# Patient Record
Sex: MALE | Race: BLACK OR AFRICAN AMERICAN | NOT HISPANIC OR LATINO | Employment: OTHER | URBAN - METROPOLITAN AREA
[De-identification: names, ages, dates, MRNs, and addresses within clinical notes are randomized per-mention and may not be internally consistent; named-entity substitution may affect disease eponyms.]

---

## 2018-09-05 ENCOUNTER — TELEPHONE (OUTPATIENT)
Dept: GASTROENTEROLOGY | Facility: CLINIC | Age: 68
End: 2018-09-05

## 2018-09-21 ENCOUNTER — TELEPHONE (OUTPATIENT)
Dept: GASTROENTEROLOGY | Facility: CLINIC | Age: 68
End: 2018-09-21

## 2018-09-21 NOTE — TELEPHONE ENCOUNTER
----- Message from Lena Loera sent at 9/21/2018 10:25 AM CDT -----  Contact: self - [phone]  merlinmi - returning your call - please call patient at

## 2018-10-31 ENCOUNTER — TELEPHONE (OUTPATIENT)
Dept: GASTROENTEROLOGY | Facility: CLINIC | Age: 68
End: 2018-10-31

## 2018-10-31 NOTE — TELEPHONE ENCOUNTER
----- Message from Lena Loera sent at 10/31/2018 12:35 PM CDT -----  Contact: self - [phone]  merlinmi = is asking to be scheduled to next week - please call patient at

## 2018-11-01 ENCOUNTER — TELEPHONE (OUTPATIENT)
Dept: GASTROENTEROLOGY | Facility: CLINIC | Age: 68
End: 2018-11-01

## 2018-11-01 NOTE — TELEPHONE ENCOUNTER
Pt rescheduled for np appt  Re explained  process.  Called pt and scheduled NP appointment for 12/20/18 at 11am. Discussed with pt that Dr. Jimenez is a consultant who will send them back to their general GI provider once their symptoms improved and plan of care is established. Pt was told the logistics of the appointment (arrival time, length of visit, total time spent at facility, and Kesha Clemens, NP role). Pt was also told that Dr. Jimenez will review their previous workup but may order additional test and perform her own procedures and that this may require an overnight stay at a local hotel to complete the workup.

## 2018-11-01 NOTE — TELEPHONE ENCOUNTER
----- Message from Alondra Su MA sent at 10/31/2018  5:40 PM CDT -----  Lena NAVA Staff  Caller: self - [phone] (Today,  4:16 PM)         randi - is asking to reschedule her appt - please call patient at

## 2018-12-19 ENCOUNTER — TELEPHONE (OUTPATIENT)
Dept: GASTROENTEROLOGY | Facility: CLINIC | Age: 68
End: 2018-12-19

## 2018-12-19 NOTE — TELEPHONE ENCOUNTER
Spoke with pt to reschedule appt. Informed pt that appt are two months out so she will have to reschedule for time in february. Pt asked to be placed on waiting list.

## 2018-12-19 NOTE — TELEPHONE ENCOUNTER
----- Message from Andreea Cardona sent at 12/19/2018 11:59 AM CST -----  Contact: pt#[phone]  Needs Advice    Reason for call:Pt is calling to reschedule appt         Communication Preference:call    Additional Information:

## 2019-02-04 ENCOUNTER — TELEPHONE (OUTPATIENT)
Dept: GASTROENTEROLOGY | Facility: CLINIC | Age: 69
End: 2019-02-04

## 2019-02-04 ENCOUNTER — OFFICE VISIT (OUTPATIENT)
Dept: GASTROENTEROLOGY | Facility: CLINIC | Age: 69
End: 2019-02-04
Payer: MEDICARE

## 2019-02-04 ENCOUNTER — LAB VISIT (OUTPATIENT)
Dept: LAB | Facility: HOSPITAL | Age: 69
End: 2019-02-04
Payer: MEDICARE

## 2019-02-04 ENCOUNTER — TELEPHONE (OUTPATIENT)
Dept: ENDOSCOPY | Facility: HOSPITAL | Age: 69
End: 2019-02-04

## 2019-02-04 VITALS
SYSTOLIC BLOOD PRESSURE: 132 MMHG | WEIGHT: 214.5 LBS | DIASTOLIC BLOOD PRESSURE: 78 MMHG | HEART RATE: 70 BPM | BODY MASS INDEX: 33.67 KG/M2 | HEIGHT: 67 IN

## 2019-02-04 DIAGNOSIS — K59.00 CONSTIPATION, UNSPECIFIED CONSTIPATION TYPE: ICD-10-CM

## 2019-02-04 DIAGNOSIS — D50.9 IRON DEFICIENCY ANEMIA, UNSPECIFIED IRON DEFICIENCY ANEMIA TYPE: ICD-10-CM

## 2019-02-04 DIAGNOSIS — R07.9 CHEST PAIN, UNSPECIFIED TYPE: ICD-10-CM

## 2019-02-04 DIAGNOSIS — R14.0 BLOATING: ICD-10-CM

## 2019-02-04 DIAGNOSIS — K59.00 CONSTIPATION, UNSPECIFIED CONSTIPATION TYPE: Primary | ICD-10-CM

## 2019-02-04 LAB
ALBUMIN SERPL BCP-MCNC: 4.3 G/DL
ALP SERPL-CCNC: 85 U/L
ALT SERPL W/O P-5'-P-CCNC: 21 U/L
ANION GAP SERPL CALC-SCNC: 9 MMOL/L
AST SERPL-CCNC: 23 U/L
BASOPHILS # BLD AUTO: 0.03 K/UL
BASOPHILS NFR BLD: 0.5 %
BILIRUB SERPL-MCNC: 1.3 MG/DL
BUN SERPL-MCNC: 17 MG/DL
CALCIUM SERPL-MCNC: 10.2 MG/DL
CHLORIDE SERPL-SCNC: 104 MMOL/L
CO2 SERPL-SCNC: 27 MMOL/L
CREAT SERPL-MCNC: 0.9 MG/DL
DIFFERENTIAL METHOD: ABNORMAL
EOSINOPHIL # BLD AUTO: 0.3 K/UL
EOSINOPHIL NFR BLD: 4 %
ERYTHROCYTE [DISTWIDTH] IN BLOOD BY AUTOMATED COUNT: 12.6 %
EST. GFR  (AFRICAN AMERICAN): >60 ML/MIN/1.73 M^2
EST. GFR  (NON AFRICAN AMERICAN): >60 ML/MIN/1.73 M^2
FERRITIN SERPL-MCNC: 241 NG/ML
GLUCOSE SERPL-MCNC: 130 MG/DL
HCT VFR BLD AUTO: 40.3 %
HGB BLD-MCNC: 12.4 G/DL
IGA SERPL-MCNC: 299 MG/DL
IMM GRANULOCYTES # BLD AUTO: 0.01 K/UL
IMM GRANULOCYTES NFR BLD AUTO: 0.2 %
IRON SERPL-MCNC: 88 UG/DL
LYMPHOCYTES # BLD AUTO: 1.7 K/UL
LYMPHOCYTES NFR BLD: 27.2 %
MCH RBC QN AUTO: 29.2 PG
MCHC RBC AUTO-ENTMCNC: 30.8 G/DL
MCV RBC AUTO: 95 FL
MONOCYTES # BLD AUTO: 0.4 K/UL
MONOCYTES NFR BLD: 6.6 %
NEUTROPHILS # BLD AUTO: 3.8 K/UL
NEUTROPHILS NFR BLD: 61.5 %
NRBC BLD-RTO: 0 /100 WBC
PLATELET # BLD AUTO: 198 K/UL
PMV BLD AUTO: 11.3 FL
POTASSIUM SERPL-SCNC: 3.8 MMOL/L
PROT SERPL-MCNC: 8 G/DL
RBC # BLD AUTO: 4.24 M/UL
SATURATED IRON: 23 %
SODIUM SERPL-SCNC: 140 MMOL/L
TOTAL IRON BINDING CAPACITY: 389 UG/DL
TRANSFERRIN SERPL-MCNC: 263 MG/DL
TSH SERPL DL<=0.005 MIU/L-ACNC: 1.11 UIU/ML
WBC # BLD AUTO: 6.21 K/UL

## 2019-02-04 PROCEDURE — 99999 PR PBB SHADOW E&M-EST. PATIENT-LVL IV: ICD-10-PCS | Mod: PBBFAC,,, | Performed by: NURSE PRACTITIONER

## 2019-02-04 PROCEDURE — 83516 IMMUNOASSAY NONANTIBODY: CPT

## 2019-02-04 PROCEDURE — 99205 OFFICE O/P NEW HI 60 MIN: CPT | Mod: S$PBB,,, | Performed by: NURSE PRACTITIONER

## 2019-02-04 PROCEDURE — 99214 OFFICE O/P EST MOD 30 MIN: CPT | Mod: PBBFAC | Performed by: NURSE PRACTITIONER

## 2019-02-04 PROCEDURE — 83540 ASSAY OF IRON: CPT

## 2019-02-04 PROCEDURE — 82784 ASSAY IGA/IGD/IGG/IGM EACH: CPT

## 2019-02-04 PROCEDURE — 99205 PR OFFICE/OUTPT VISIT, NEW, LEVL V, 60-74 MIN: ICD-10-PCS | Mod: S$PBB,,, | Performed by: NURSE PRACTITIONER

## 2019-02-04 PROCEDURE — 85025 COMPLETE CBC W/AUTO DIFF WBC: CPT

## 2019-02-04 PROCEDURE — 84443 ASSAY THYROID STIM HORMONE: CPT

## 2019-02-04 PROCEDURE — 36415 COLL VENOUS BLD VENIPUNCTURE: CPT

## 2019-02-04 PROCEDURE — 80053 COMPREHEN METABOLIC PANEL: CPT

## 2019-02-04 PROCEDURE — 82728 ASSAY OF FERRITIN: CPT

## 2019-02-04 PROCEDURE — 99999 PR PBB SHADOW E&M-EST. PATIENT-LVL IV: CPT | Mod: PBBFAC,,, | Performed by: NURSE PRACTITIONER

## 2019-02-04 RX ORDER — ESOMEPRAZOLE MAGNESIUM 40 MG/1
CAPSULE, DELAYED RELEASE ORAL
Refills: 2 | COMMUNITY
Start: 2019-01-02 | End: 2019-02-04 | Stop reason: DRUGHIGH

## 2019-02-04 RX ORDER — ROSUVASTATIN CALCIUM 10 MG/1
TABLET, COATED ORAL
Refills: 2 | COMMUNITY
Start: 2019-01-21

## 2019-02-04 RX ORDER — BLOOD-GLUCOSE METER
EACH MISCELLANEOUS
Refills: 1 | COMMUNITY
Start: 2018-11-05

## 2019-02-04 RX ORDER — GABAPENTIN 400 MG/1
400 CAPSULE ORAL 3 TIMES DAILY
COMMUNITY
End: 2019-06-28

## 2019-02-04 RX ORDER — LINACLOTIDE 290 UG/1
CAPSULE, GELATIN COATED ORAL
Refills: 4 | COMMUNITY
Start: 2019-01-31 | End: 2019-02-04

## 2019-02-04 RX ORDER — ONDANSETRON 4 MG/1
TABLET, FILM COATED ORAL
Refills: 1 | COMMUNITY
Start: 2019-01-02

## 2019-02-04 RX ORDER — OLMESARTAN MEDOXOMIL AND HYDROCHLOROTHIAZIDE 40/25 40; 25 MG/1; MG/1
TABLET ORAL
Refills: 1 | COMMUNITY
Start: 2018-11-23

## 2019-02-04 RX ORDER — POTASSIUM CHLORIDE 750 MG/1
CAPSULE, EXTENDED RELEASE ORAL
Refills: 5 | COMMUNITY
Start: 2019-01-10

## 2019-02-04 RX ORDER — METOPROLOL SUCCINATE 25 MG/1
TABLET, EXTENDED RELEASE ORAL
Refills: 3 | COMMUNITY
Start: 2018-11-25

## 2019-02-04 RX ORDER — LEVALBUTEROL INHALATION SOLUTION 1.25 MG/3ML
SOLUTION RESPIRATORY (INHALATION)
Refills: 6 | COMMUNITY
Start: 2018-12-15

## 2019-02-04 RX ORDER — FLUTICASONE PROPIONATE 50 MCG
SPRAY, SUSPENSION (ML) NASAL
Refills: 5 | COMMUNITY
Start: 2018-12-11

## 2019-02-04 RX ORDER — CYCLOBENZAPRINE HCL 10 MG
10 TABLET ORAL 3 TIMES DAILY PRN
COMMUNITY

## 2019-02-04 RX ORDER — GLIMEPIRIDE 2 MG/1
TABLET ORAL
Refills: 1 | COMMUNITY
Start: 2018-11-29

## 2019-02-04 RX ORDER — ESOMEPRAZOLE MAGNESIUM 40 MG/1
40 CAPSULE, DELAYED RELEASE ORAL
Qty: 60 CAPSULE | Refills: 6 | Status: SHIPPED | OUTPATIENT
Start: 2019-02-04 | End: 2019-06-07

## 2019-02-04 RX ORDER — LUBIPROSTONE 24 UG/1
24 CAPSULE ORAL 2 TIMES DAILY WITH MEALS
Qty: 180 CAPSULE | Refills: 3 | Status: SHIPPED | OUTPATIENT
Start: 2019-02-04 | End: 2019-05-05

## 2019-02-04 RX ORDER — OXYCODONE AND ACETAMINOPHEN 5; 325 MG/1; MG/1
1 TABLET ORAL EVERY 4 HOURS PRN
COMMUNITY

## 2019-02-04 NOTE — PROGRESS NOTES
[facility] Consultation Note    Reason for Consult:    Chief Complaint   Patient presents with    Chest Pain    Abdominal Pain    Gas    Bloated    Constipation         PCP:   Kesha Perera   [address] / [place] [address]    Referring MD:  Bimal Marie Md  [address] [place]   [address] [place] [address]      HPI:  Louisa Rowley is a 68 y.o. female with a PMH of anemia, asthma, anxiety, colon CA s/p resection in 1997, DM 2, HTN, HLD referred to motility clinic for second opinion regarding the following problems:    GERD. Belch. Regurgitation. Well controlled with nexium 40 mg daily.    Chest pain. Reports bothersome chest pain and spasms of esophagus.   Onset: several years  Frequency: couple days monthly       Triggers (cold fluids, caffeine, smoking, ETOH): associated with bloating after meals  Consumes mostly soft foods and liquids:regular  Caffeine intake:1 cup coffee 1-2 days weekly  Negative cardiac workup:  Follows with cardiology. EKG, labs, Stress test negative. ECHO soon.     Some improvement with nexium 40 mg daily  Has not tried the others:   nifedipine   diltiazem   isosorrbide dinitrate   peppermint oil   sildenafil   trazodone   Botox    Abdominal pain. Reports abdominal pain. Cramping if taking laxatives for severe constipation.     DM 2.  BS running 140s. Last A1c 7.3 on friday. On Januvia 100 mg daily and glimepiride 2 mg BID. Followed by PCP.    Gas and bloating.   Bloating: yes  Excessive gas: yes  Abdominal distension: yes   Symptoms get worse after meals:yes  Symptoms get worse as the day progresses:  no  Consumes lactose:yes  Consumes artificial sugars:yes    Constipation.  Reports bothersome constipation:  Lumpy and hard, difficult to pass stools:bristol type 1, 4, 6,   Urgency and sensation of incomplete evacuation: yes  Straining during defecation:no  Sensation of anorectal blockage:sometimes  Rectal prolapse:no  Fewer than three  spontaneous bowel movements per week:yes  Frequency:4 days weekly  Symptoms started: since childhood with progressive worsening after colon ca in 1997  Uses manual maneuvers to facilitate defecation:no    Problems in early childhood: as a young child  History of perineal trauma: no    Previous evaluation: no  Previous pelvic muscle retraining: no    No improvement with miralax once daily  Some improvement with Linzess 290 mcg daily unless going out    No improvement with Amitiza 16 mcg BID  No improvement with Trulance 3 mg daily x 2 weeks  Unable to tolerate dulcolax d/t abd cramping  No improvement with lactulose  Has no tried senna  No improvement with colace  Has not tried fiber supplement      History of anxiety. No problems with it any longer. Has not seen psych.     Anemia. On MV with iron.     Personal history of colon cancer. descending colon 1997. Resected.     Colonic blockages x 2. Twisted bowels. surgically repaired.     Joint pain. Joint swelling. Knees, shoulder, hands. Osteoarthritis.  No raynaud's. No hypermobility. Has seen rheumatologist in the past.     Sciatica. On gabapentin 400 mg BID PRN. Previously followed by neurology. Currently followed by PCP.     Hypokalemia. On potassium 10 mEq daily.     Denies dysphagia,  nausea, vomiting, early satiety, diarrhea,  BRBPR, melena, weight loss, anxiety/depression, insomnia.       Total visit time was 90 minutes, more than 50% of which was spent in face-to-face counseling with patient regarding symptoms, diagnostic results, prognosis, risks and benefits of treatment options, instructions for management, importance of compliance with chosen treatment options, risk factor reduction, stress reduction, coping strategies.      Previous Studies:   Colon 5/10/18: GPTC. 6 mm AC polyp(fecal material). 9 mm AC polyp (TA), 3mm AC polyp (TA). 3 mm AC polyp(TA). Small IH. Rpt in 1 year.  GES 4/10/2017: nl. T ½ 75 min  *KUB 12/2016: unremarkable  *Colon 5/26/15: s/p  surgery in [place]. 7 mm TC polyp (?). 4 mm SC polyp(?).   *Colon 10/28/14: 1.5 cm AC polyp(?). 5 mm TC polyp (?). 2.5 cm TC polyp (TVA). IH.     *per referring provider report    Labs:   6/1/15: hgb nl 13.5    Relevant Surgeries:  Colonic resection 2/2 colon CA (1997)      ROS:  ROS   Constitutional: No fevers, no chills, + night sweats, no weight loss  ENT: No congestion, no rhinorrhea, + chronic sinus problems  CV: No chest pain, no palpitations  Pulm: + cough, no shortness of breath  Ophtho: + blurry vision, no eye redness  GI: see HPI  Derm: No rash  Heme: No lymphadenopathy, no bruising  MSK: + joint pain, + joint swelling, no Raynauds  : No dysuria, no frequent urination, no blood in urine  Endo: No hot or cold intolerance  Neuro: No dizziness, no syncope, no seizure  Psych: No anxiety, no depression        Medical History:   Past Medical History:   Diagnosis Date    Acute exacerbation of bronchiectasis     Anemia     Anxiety     Asthma     Chronic constipation     Colon cancer 1997    no chemo/rad    Diabetes     not insulin dependent     High blood pressure     Hypercholesterolemia     Hyperlipidemia     OA (osteoarthritis) of knee     Personal history of colonic polyps         Surgical History:   Past Surgical History:   Procedure Laterality Date    BACK SURGERY      x2    bladder lift      bowel blockage      x2    CARDIAC CATHETERIZATION      COLECTOMY      COLONOSCOPY      HYSTERECTOMY      TOTAL KNEE ARTHROPLASTY Right     TOTAL SHOULDER ARTHROPLASTY Right         Family History:   Family History   Problem Relation Age of Onset    Prostate cancer Father     Arthritis Mother     Cancer Maternal Aunt     Celiac disease Neg Hx     Cirrhosis Neg Hx     Colon cancer Neg Hx     Colon polyps Neg Hx     Crohn's disease Neg Hx     Cystic fibrosis Neg Hx     Esophageal cancer Neg Hx     Hemochromatosis Neg Hx     Inflammatory bowel disease Neg Hx     Irritable bowel syndrome Neg Hx      Liver cancer Neg Hx     Liver disease Neg Hx     Rectal cancer Neg Hx     Stomach cancer Neg Hx     Ulcerative colitis Neg Hx     Westley's disease Neg Hx     Lymphoma Neg Hx     Tuberculosis Neg Hx     Scleroderma Neg Hx     Rheum arthritis Neg Hx     Multiple sclerosis Neg Hx     Melanoma Neg Hx     Lupus Neg Hx     Psoriasis Neg Hx     Skin cancer Neg Hx         Social History:   Social History     Socioeconomic History    Marital status:      Spouse name: None    Number of children: None    Years of education: None    Highest education level: None   Social Needs    Financial resource strain: None    Food insecurity - worry: None    Food insecurity - inability: None    Transportation needs - medical: None    Transportation needs - non-medical: None   Occupational History    None   Tobacco Use    Smoking status: Former Smoker    Smokeless tobacco: Never Used    Tobacco comment: quit in 2000   Substance and Sexual Activity    Alcohol use: No     Frequency: Never    Drug use: No    Sexual activity: None   Other Topics Concern    None   Social History Narrative    None        Review of patient's allergies indicates:  Allergies not on file    Current Outpatient Medications   Medication Sig Dispense Refill    cyclobenzaprine (FLEXERIL) 10 MG tablet Take 10 mg by mouth 3 (three) times daily as needed for Muscle spasms.      gabapentin (NEURONTIN) 400 MG capsule Take 400 mg by mouth 3 (three) times daily.      LANCETS MISC 1 lancet by Misc.(Non-Drug; Combo Route) route once daily.      multivit-min/iron/folic/lutein (CENTRUM SILVER WOMEN ORAL) Take by mouth.      nebulizer accessories Kit by Misc.(Non-Drug; Combo Route) route.      ONETOUCH ULTRASOFT LANCETS MISC 1 lancet by Misc.(Non-Drug; Combo Route) route 3 (three) times daily.      oxyCODONE-acetaminophen (PERCOCET) 5-325 mg per tablet Take 1 tablet by mouth every 4 (four) hours as needed for Pain.      SITagliptin  "(JANUVIA) 100 MG Tab Take 100 mg by mouth once daily.      esomeprazole (NEXIUM) 40 MG capsule Take 1 capsule (40 mg total) by mouth 2 (two) times daily before meals. 60 capsule 6    fluticasone (FLONASE) 50 mcg/actuation nasal spray SHAKE LQ AND U 2 SPRAYS IEN D  5    glimepiride (AMARYL) 2 MG tablet TK 2 TS PO BID  1    levalbuterol (XOPENEX) 1.25 mg/3 mL nebulizer solution U 3 ML VIA NEB Q 8 H  6    lubiprostone (AMITIZA) 24 MCG Cap Take 1 capsule (24 mcg total) by mouth 2 (two) times daily with meals. 180 capsule 3    metoprolol succinate (TOPROL-XL) 25 MG 24 hr tablet TK 1 T PO BID  3    olmesartan-hydrochlorothiazide (BENICAR HCT) 40-25 mg per tablet TK 1 T PO QD  1    ondansetron (ZOFRAN) 4 MG tablet TK 1 T PO TID PRF NAUSEA  1    ONETOUCH VERIO Strp TEST TID AS DIRECTED  1    potassium chloride (MICRO-K) 10 MEQ CpSR TK ONE C PO  QD  5    rosuvastatin (CRESTOR) 10 MG tablet TK 1 T PO D  2     No current facility-administered medications for this visit.         Objective Findings:  Vital Signs:  /78   Pulse 70   Ht 5' 7" (1.702 m)   Wt 97.3 kg (214 lb 8.1 oz)   BMI 33.60 kg/m²   Body mass index is 33.6 kg/m².    Physical Exam:  General appearance: alert, cooperative, no distress  HENT: Normocephalic, atraumatic, neck symmetrical, no nasal discharge  Eyes: conjunctivae/corneas clear, PERRL, EOM's intact  Lungs: clear to auscultation bilaterally, no dullness to percussion bilaterally  Heart: regular rate and rhythm without rub; no displacement of the PMI  Abdomen: soft, non-tender; bowel sounds normoactive; no organomegaly  Extremities: extremities symmetric; no clubbing, cyanosis, or edema  Integument: Skin color, texture, turgor normal; no rashes; hair distrubution normal  Neurologic: Alert and oriented X 3, normal strength, normal coordination and gait  Psychiatric: no pressured speech; normal affect; no evidence of impaired cognition    RECTAL EXAM:  Hemorrhoids: no  Tenderness: no  Skin " tags: yes  Fissure: no  Prolapse on beardown: no  No midline scar  Rectocele: distal  SENSATION:  Normal sensation: yes  Anal reflex: normal  ANAL SPHINCTER   Normal resting tone: yes  Squeeze pressure: normal/borderline hypertonic  BEAR DOWN:  Increased abdominal pressure: noromal  Perineal descent: noromal  Relaxation of EAS: no  Stool in volt: no    Labs:  No results found for: WBC, HGB, HCT, MCV, PLT  No results found for: FERRITIN  No results found for: NA, K, CL, CO2, GLU, BUN, CREATININE, CALCIUM, PROT, ALBUMIN, BILITOT, ALKPHOS, AST, ALT  No results found for: TSH  No results found for: SEDRATE  No results found for: CRP  No results found for: LABA1C, HGBA1C        Assessment and Plan:  Louisa Rowley is a 68 y.o. female with a PMH of anemia, asthma, anxiety, colon CA s/p resection in 1997, DM 2, HTN, HLD referred to motility clinic for second opinion regarding the following problems:    GERD. Belch. Regurgitation.   Well controlled with nexium 40 mg daily.    Chest pain. Associated with bloating after meals.   Follows with cardiology. EKG, labs, Stress test negative. Will be having ECHO soon.   Some improvement with nexium 40 mg daily  -Increase nexium 40 mg to BID.  -Has not tried nifedipine, diltiazem, isosorbide, peppermint oil, sildenafil, trazodone, botox.      Abdominal pain.  Cramping if taking laxatives for severe constipation.   -Will consider IBguard prn     DM 2.  BS running 140s. Last A1c 7.3 on friday. On Januvia 100 mg daily and glimepiride 2 mg BID. Followed by PCP.    Gas and bloating. w distention.   -Eliminate lactose and artificial sugars.     Constipation.  Rectal exam with evidence of anorectal dysfunction.   Unable to tolerate dulcolax d/t abd cramping  No improvement with miralax once daily  No improvement with Amitiza 16 mcg BID  No improvement with Trulance 3 mg daily x 2 weeks  No improvement with lactulose  No improvement with colace  Some improvement with Linzess 290 mcg  daily  -Stop linzess and start amitiza 24 mcg BID.   -ARM  -MRI defecography  -SmartPill  -Check labs    -Has not tried fiber supplement, senna    History of anxiety. No problems with it any longer. Has not seen psych.     Anemia. On MV with iron.   -Check labs    Personal history of colon cancer. descending colon 1997. Resected. History of advanced polyps.  -Colonoscopy due in 5/2019    Colonic blockages x 2. Twisted bowels. surgically repaired.     Joint pain. Joint swelling. Knees, shoulder, hands. Osteoarthritis.  No raynaud's. No hypermobility. Has seen rheumatologist in the past.     Sciatica. On gabapentin 400 mg BID PRN. Previously followed by neurology. Currently followed by PCP.     Hypokalemia. On potassium 10 mEq daily.       Follow-up in about 2 months (around 4/4/2019) for Motility w JEFRY Barton.    1. Constipation, unspecified constipation type    2. Iron deficiency anemia, unspecified iron deficiency anemia type    3. Bloating    4. Chest pain, unspecified type          Order summary:  Orders Placed This Encounter    MRI Pelvis With Contrast    CBC auto differential    Comprehensive metabolic panel    TSH    Iron and TIBC    Ferritin    TISSUE TRANSGLUTAMINASE (TTG), IGA    IgA    Capsule Video Endoscopy    lubiprostone (AMITIZA) 24 MCG Cap    esomeprazole (NEXIUM) 40 MG capsule    Case request GI: MANOMETRY, ANORECTAL         Thank you so much for allowing me to participate in the care of Louisa Rowley          ILA Connor, FNP-C    I have personally reviewed history, performed physical exam, and educated the patient.  I have reviewed and agree with today's findings and the care plan outlined by Kesha Perera NP.       Bety Jimenez MD

## 2019-02-04 NOTE — LETTER
February 4, 2019        Bimal Marie MD  [address]  [address]  [place] [address]             [facility]  [address]  [place] [address]  Phone: [phone]  Fax: [phone]   Patient: Louisa Rowley   MR Number: [id number]   YOB: 1950   Date of Visit: 2/4/2019       Dear Dr. Marie:    Thank you for referring Louisa Rowley to me for evaluation. Attached you will find relevant portions of my assessment and plan of care.    If you have questions, please do not hesitate to call me. I look forward to following Louisa Rowley along with you.    Sincerely,                  CC  No Recipients    Enclosure

## 2019-02-04 NOTE — TELEPHONE ENCOUNTER
MOTILITY CLINIC PROCEDURE ORDERS    CLEARANCE FOR PROCEDURES:  Not needed     PROCEDURES  Anorectal manometry       FLOOR:  4th Floor     PREP  Standard Prep    MEDICATIONS     Motility Studies (esophageal manometry/anorectal manometry)  Hold Narcotics x 3 days   Hold TCA x 3 days  No fentanyl of benzodiazepine during sedation     ORDER OF TESTING:  Day 1: ARM then MRI defecogram  Sometime thereafter: Ariana

## 2019-02-06 LAB — TTG IGA SER-ACNC: 8 UNITS

## 2019-02-11 ENCOUNTER — HOSPITAL ENCOUNTER (OUTPATIENT)
Facility: HOSPITAL | Age: 69
Discharge: HOME | End: 2019-02-11
Attending: INTERNAL MEDICINE | Admitting: INTERNAL MEDICINE
Payer: MEDICARE

## 2019-02-11 VITALS
OXYGEN SATURATION: 98 % | HEART RATE: 78 BPM | HEIGHT: 67 IN | RESPIRATION RATE: 16 BRPM | SYSTOLIC BLOOD PRESSURE: 155 MMHG | BODY MASS INDEX: 33.43 KG/M2 | DIASTOLIC BLOOD PRESSURE: 76 MMHG | WEIGHT: 213 LBS | TEMPERATURE: 98 F

## 2019-02-11 DIAGNOSIS — K59.00 CONSTIPATION: ICD-10-CM

## 2019-02-11 PROCEDURE — 91122 HC ANORECTAL MANOMETRY: CPT | Performed by: INTERNAL MEDICINE

## 2019-02-11 PROCEDURE — 91122 PR ANAL PRESSURE RECORD: CPT | Mod: 26,,, | Performed by: INTERNAL MEDICINE

## 2019-02-11 PROCEDURE — 91122 PR ANAL PRESSURE RECORD: ICD-10-PCS | Mod: 26,,, | Performed by: INTERNAL MEDICINE

## 2019-02-13 ENCOUNTER — HOSPITAL ENCOUNTER (OUTPATIENT)
Dept: RADIOLOGY | Facility: HOSPITAL | Age: 69
Discharge: HOME | End: 2019-02-13
Attending: NURSE PRACTITIONER
Payer: MEDICARE

## 2019-02-13 ENCOUNTER — TELEPHONE (OUTPATIENT)
Dept: GASTROENTEROLOGY | Facility: CLINIC | Age: 69
End: 2019-02-13

## 2019-02-13 DIAGNOSIS — K59.00 CONSTIPATION, UNSPECIFIED CONSTIPATION TYPE: ICD-10-CM

## 2019-02-13 PROCEDURE — 72196 MRI PELVIS W/DYE: CPT | Mod: 26,,, | Performed by: RADIOLOGY

## 2019-02-13 PROCEDURE — 72196 MRI DEFECOGRAPHY: ICD-10-PCS | Mod: 26,,, | Performed by: RADIOLOGY

## 2019-02-13 PROCEDURE — 72196 MRI PELVIS W/DYE: CPT | Mod: TC

## 2019-02-13 NOTE — TELEPHONE ENCOUNTER
----- Message from Uma Wolf sent at 2/13/2019  8:45 AM CST -----  Contact: Self- [phone]  Dilma- pt called with questions regarding MRI that's scheduled today 2/13 at 4pm- prep related questions- please contact pt at [phone]

## 2019-02-14 ENCOUNTER — TELEPHONE (OUTPATIENT)
Dept: GASTROENTEROLOGY | Facility: CLINIC | Age: 69
End: 2019-02-14

## 2019-02-14 DIAGNOSIS — K59.00 CONSTIPATION, UNSPECIFIED CONSTIPATION TYPE: Primary | ICD-10-CM

## 2019-02-14 NOTE — [employer] PATIENT INSTRUCTIONS
Discharge Summary/Instructions after an Endoscopic Procedure  Patient Name: Louisa Rowley  Patient MRN: [id number]  Patient YOB: 1950  Monday, February 11, 2019  Bety Jimenez MD  RESTRICTIONS:  During your procedure today, you received medications for sedation.  These   medications may affect your judgment, balance and coordination.  Therefore,   for 24 hours, you have the following restrictions:   - DO NOT drive a car, operate machinery, make legal/financial decisions,   sign important papers or drink alcohol.    ACTIVITY:  Today: no heavy lifting, straining or running due to procedural   sedation/anesthesia.  The following day: return to full activity including work.  DIET:  Eat and drink normally unless instructed otherwise.     TREATMENT FOR COMMON SIDE EFFECTS:  - Mild abdominal pain, nausea, belching, bloating or excessive gas:  rest,   eat lightly and use a heating pad.  - Sore Throat: treat with throat lozenges and/or gargle with warm salt   water.  - Because air was used during the procedure, expelling large amounts of air   from your rectum or belching is normal.  - If a bowel prep was taken, you may not have a bowel movement for 1-3 days.    This is normal.  SYMPTOMS TO WATCH FOR AND REPORT TO YOUR PHYSICIAN:  1. Abdominal pain or bloating, other than gas cramps.  2. Chest pain.  3. Back pain.  4. Signs of infection such as: chills or fever occurring within 24 hours   after the procedure.  5. Rectal bleeding, which would show as bright red, maroon, or black stools.   (A tablespoon of blood from the rectum is not serious, especially if   hemorrhoids are present.)  6. Vomiting.  7. Weakness or dizziness.  GO DIRECTLY TO THE NEAREST EMERGENCY ROOM IF YOU HAVE ANY OF THE FOLLOWING:      Difficulty breathing              Chills and/or fever over 101 F   Persistent vomiting and/or vomiting blood   Severe abdominal pain   Severe chest pain   Black, tarry stools   Bleeding- more than one  tablespoon   Any other symptom or condition that you feel may need urgent attention  Your doctor recommends these additional instructions:  If any biopsies were taken, your doctors clinic will contact you in 1 to 2   weeks with any results.  - Consider biofeedback for the treatment of dyssyngergic defecation.? Follow   up in the GI clinic.  For questions, problems or results please call your physician - Bety Jimenez MD at Work:  [phone].  [facility], EMERGENCY ROOM PHONE NUMBER: [phone]  IF A COMPLICATION OR EMERGENCY SITUATION ARISES AND YOU ARE UNABLE TO REACH   YOUR PHYSICIAN - GO DIRECTLY TO THE EMERGENCY ROOM.  Bety Jimenez MD  2/14/2019 5:55:12 PM  This report has been verified and signed electronically.  [employer]

## 2019-02-15 NOTE — TELEPHONE ENCOUNTER
Anorectal Manometry       Please let patient know that anorectal manometry shows:     Abnormal coordination of muscle during defecation   Hyposensensitivity of rectum       I would like to:   - Refer her to PT for biofeedback (the physical therapist will work with him/her to help improve the function of the rectum).  Pls arrange.    Follow up w Dr. Jimenez or peyton

## 2019-02-15 NOTE — TELEPHONE ENCOUNTER
Manometry results given to pt. Pt verbalized understanding. Pt provided w PT contact due to the requirement of pt having to arrange that appt. F/u previously scheduled.

## 2019-03-15 ENCOUNTER — TELEPHONE (OUTPATIENT)
Dept: GASTROENTEROLOGY | Facility: CLINIC | Age: 69
End: 2019-03-15

## 2019-03-15 NOTE — TELEPHONE ENCOUNTER
Spoke with pt and got Dr. Liz number [phone]  to call for fax to send off results. Fax sent to [facility] [place] [phone]

## 2019-03-15 NOTE — TELEPHONE ENCOUNTER
----- Message from Kesha Perera NP sent at 3/14/2019  4:48 PM CDT -----  Please inform pt: labs show slightly elevated bilirubin. Please have her alert her referring GI provider about this for further evaluation or for referral to a local liver provider.     Thanks,  JEFRY Rebolledo

## 2019-03-15 NOTE — TELEPHONE ENCOUNTER
----- Message from Uma Wolf sent at 3/15/2019 11:07 AM CDT -----  Contact: Self- [phone]  Dilma- Pt asked can elevated bilirubin results be faxed to Dr. Liz in [facility] - pt did not have fax number at time of call- please contact pt at [phone]

## 2019-04-10 ENCOUNTER — TELEPHONE (OUTPATIENT)
Dept: GASTROENTEROLOGY | Facility: CLINIC | Age: 69
End: 2019-04-10

## 2019-04-10 NOTE — TELEPHONE ENCOUNTER
Spoke with pt and offered 4/17/19 smart pill appt . Pt accepted appt and asked for fu with Kesha to be rescheduled. Appt was rescheduled for 5/3/19 at 10AM.

## 2019-04-11 ENCOUNTER — TELEPHONE (OUTPATIENT)
Dept: GASTROENTEROLOGY | Facility: CLINIC | Age: 69
End: 2019-04-11

## 2019-04-11 NOTE — TELEPHONE ENCOUNTER
Message left for patient to return call regarding smart pill checklist and instruction, also left in message that procedure will be cancelled if we don't receive a call back from her.

## 2019-04-12 ENCOUNTER — TELEPHONE (OUTPATIENT)
Dept: GASTROENTEROLOGY | Facility: CLINIC | Age: 69
End: 2019-04-12

## 2019-04-12 NOTE — TELEPHONE ENCOUNTER
Smart Pill Checklist:    ___I DO NOT Have swallowing disorder, or severe dysphagia to food or pills    ___I DO NOT have an implanted or portable electro-mechanical device    ___I DO NOT have a physiological/mechanical GI obstruction, stricture or fistula    ___I DO NOT have a Bezoar or history of bezoars (a mass of undigested material             staying in the stomach)    ___I DO NOT have Crohn's disease or Diverticulitis    ___I HAVE NOT had GI surgery within the past 3 months    ___I am NOT scheduled for an MRI within the next 2 weeks    Discussed the above checklist with the patient. She reports she has none of the above conditions or problems. Instructed patient on purpose of smart pill, duration, and restrictions. Instructed to stop taking Nexium 7 days before procedure. Instructed to stop amitiza, flexeril, and percocet 2 days before procedure. Patient verbalized understanding. Patient instructions sheet and medication restriction list sent in mail.

## 2019-04-17 ENCOUNTER — TELEPHONE (OUTPATIENT)
Dept: GASTROENTEROLOGY | Facility: CLINIC | Age: 69
End: 2019-04-17

## 2019-04-17 NOTE — TELEPHONE ENCOUNTER
Message left for patient to return call regarding smart pill arrival time.  In message reminded patient to stop nexium as of today and flexeril, amitiza, and percocet 2 days before smart pill procedure.

## 2019-04-22 ENCOUNTER — TELEPHONE (OUTPATIENT)
Dept: GASTROENTEROLOGY | Facility: CLINIC | Age: 69
End: 2019-04-22

## 2019-04-22 NOTE — TELEPHONE ENCOUNTER
Patient reports she had to take a nexium and some enemas to help her stomach. Smart pill cancelled. Patient will call and reschedule when shes ready.

## 2019-04-22 NOTE — TELEPHONE ENCOUNTER
----- Message from Teresa Acevedo MA sent at 4/22/2019 11:20 AM CDT -----  Contact: Pt:[phone]      ----- Message -----  From: Barbara Ball  Sent: 4/22/2019  10:23 AM  To: Dilma Rebolledo Staff    .Needs Advice    Reason for call:Pt called and states she would like to speak with Milana RN in regards to her smart pill procedure.      Communication Preference:Pt:[phone]    Additional Information:

## 2019-05-01 ENCOUNTER — HOSPITAL ENCOUNTER (OUTPATIENT)
Dept: RADIOLOGY | Facility: HOSPITAL | Age: 69
Discharge: HOME | End: 2019-05-01
Attending: NURSE PRACTITIONER
Payer: MEDICARE

## 2019-05-01 ENCOUNTER — OFFICE VISIT (OUTPATIENT)
Dept: GASTROENTEROLOGY | Facility: CLINIC | Age: 69
End: 2019-05-01
Payer: MEDICARE

## 2019-05-01 ENCOUNTER — TELEPHONE (OUTPATIENT)
Dept: GASTROENTEROLOGY | Facility: CLINIC | Age: 69
End: 2019-05-01

## 2019-05-01 VITALS
SYSTOLIC BLOOD PRESSURE: 136 MMHG | HEIGHT: 67 IN | DIASTOLIC BLOOD PRESSURE: 77 MMHG | BODY MASS INDEX: 33.78 KG/M2 | WEIGHT: 215.19 LBS | HEART RATE: 66 BPM

## 2019-05-01 DIAGNOSIS — K59.00 CONSTIPATION, UNSPECIFIED CONSTIPATION TYPE: ICD-10-CM

## 2019-05-01 DIAGNOSIS — R10.84 GENERALIZED ABDOMINAL PAIN: ICD-10-CM

## 2019-05-01 DIAGNOSIS — Z12.11 SCREEN FOR COLON CANCER: Primary | ICD-10-CM

## 2019-05-01 PROCEDURE — 74019 XR ABDOMEN FLAT AND ERECT: ICD-10-PCS | Mod: 26,,, | Performed by: RADIOLOGY

## 2019-05-01 PROCEDURE — 99999 PR PBB SHADOW E&M-EST. PATIENT-LVL V: ICD-10-PCS | Mod: PBBFAC,,, | Performed by: NURSE PRACTITIONER

## 2019-05-01 PROCEDURE — 99999 PR PBB SHADOW E&M-EST. PATIENT-LVL V: CPT | Mod: PBBFAC,,, | Performed by: NURSE PRACTITIONER

## 2019-05-01 PROCEDURE — 99215 OFFICE O/P EST HI 40 MIN: CPT | Mod: PBBFAC,25 | Performed by: NURSE PRACTITIONER

## 2019-05-01 PROCEDURE — 74019 RADEX ABDOMEN 2 VIEWS: CPT | Mod: 26,,, | Performed by: RADIOLOGY

## 2019-05-01 PROCEDURE — 99215 OFFICE O/P EST HI 40 MIN: CPT | Mod: S$PBB,,, | Performed by: NURSE PRACTITIONER

## 2019-05-01 PROCEDURE — 99215 PR OFFICE/OUTPT VISIT, EST, LEVL V, 40-54 MIN: ICD-10-PCS | Mod: S$PBB,,, | Performed by: NURSE PRACTITIONER

## 2019-05-01 PROCEDURE — 74019 RADEX ABDOMEN 2 VIEWS: CPT | Mod: TC

## 2019-05-01 RX ORDER — MONTELUKAST SODIUM 10 MG/1
TABLET ORAL
Refills: 5 | COMMUNITY
Start: 2019-04-09

## 2019-05-01 NOTE — TELEPHONE ENCOUNTER
----- Message from Kesha Perera NP sent at 5/1/2019 11:22 AM CDT -----  Please inform pt: abdominal xray is normal. No signs of obstruction. Only stool in the rectum.    Thanks,  JEFRY Rebolledo

## 2019-05-01 NOTE — TELEPHONE ENCOUNTER
MOTILITY CLINIC PROCEDURE ORDERS    CLEARANCE FOR PROCEDURES:  Not needed     PROCEDURES    Colonoscopy     FLOOR:  4th Floor     PREP  Severe Constipation Prep (Low residue diet 7 days.  1.5 prep the day prior, 0.5 prep the day of procedure)  Full liquid diet 3 days     MEDICATIONS     Motility Studies (esophageal manometry/anorectal manometry)  Hold Narcotics x 3 days   Hold TCA x 3 days  Propofol only. No fentanyl of benzodiazepine during sedation . If additional sedation needed, discuss with Dr. Jimenez.    ORDER OF TESTING:  Day 1: colonoscopy

## 2019-05-01 NOTE — PATIENT INSTRUCTIONS
Continue to take amitiza 24 mcg twice daily. Add over the counter miralax once daily. If no improvement after 5 days, increase the miralax to twice daily. If no improvement after 5 days, increase the miralax to three times daily. If no improvement after 5 days, increase the miralax to four times daily.    Continue nexium 40 mg twice daily for reflux.    Start over the counter IBgard as needed for abdominal pain.    We will schedule you for your smartpill for further evaluation.     A colonoscopy has been ordered to screen for colon cancer.    I have ordered an xray to ensure there is no intestinal obstruction.     Proceed with physical therapy for pelvic floor dysfunction. This will help your constipation. It may take 8-12 sessions.

## 2019-05-01 NOTE — PROGRESS NOTES
[facility] Consultation Note    Reason for Consult:    Chief Complaint   Patient presents with    Heartburn    Abdominal Pain    Bloated    Gas    Constipation         PCP:   Kesha Perera       Referring MD:  Dr. Bimal Marie      HPI:  Louisa Rowley is a 68 y.o. female with a PMH of anemia, asthma, anxiety, colon CA s/p resection in 1997, DM 2, HTN, HLD referred to motility clinic for second opinion regarding the following problems:    GERD. Belch. Regurgitation. Well controlled with nexium 40 mg BID.    Chest pain. Not problematic any longer since last visit (increased nexium 40 mg to BID). Occurs very rarely.  Onset: several years       Triggers (cold fluids, caffeine, smoking, ETOH): associated with bloating after meals  Consumes mostly soft foods and liquids:regular  Caffeine intake:1 cup coffee 1-2 days weekly  Negative cardiac workup:  Follows with cardiology. EKG, labs, Stress test negative. ECHO soon.     Some improvement with nexium 40 mg BID  Has not tried the others:   nifedipine   diltiazem   isosorrbide dinitrate   peppermint oil   sildenafil   trazodone   Botox    Abdominal pain. Reports abdominal pain. Cramping if taking laxatives for severe constipation. Still occurs. Also abd pain if unable to have BM.    DM 2.  BS running 130s. Last A1c 7.3 on friday. On Januvia 100 mg daily and glimepiride 2 mg BID. Followed by PCP.    Gas and bloating. Some improvement with eliminating dairy and artificial sugars.  Bloating: yes  Excessive gas: yes  Abdominal distension: yes   Symptoms get worse after meals:yes  Symptoms get worse as the day progresses:  no    Constipation.  Reports bothersome constipation:  Lumpy and hard, difficult to pass stools:bristol type 1,   Urgency and sensation of incomplete evacuation: yes  Straining during defecation:no  Sensation of anorectal blockage:sometimes  Rectal prolapse:no  Fewer than three spontaneous bowel movements per  week:yes  Frequency:1 day weekly  Symptoms started: since childhood with progressive worsening after colon ca in 1997  Uses manual maneuvers to facilitate defecation:no    Problems in early childhood: as a young child  History of perineal trauma: no    Previous evaluation: no  Previous pelvic muscle retraining: no    Amitiza 24 mcg BID lost efficacy after a few months.  No improvement with miralax once daily  Some improvement with Linzess 290 mcg daily unless going out    No improvement with Amitiza 16 mcg BID  No improvement with Trulance 3 mg daily x 2 weeks  Unable to tolerate dulcolax d/t abd cramping  No improvement with lactulose  Has no tried senna  No improvement with colace  Has not tried fiber supplement      History of anxiety. No problems with it any longer. Has not seen psych.     Anemia. On MV with iron. labs in 2/2019 with normal HGB    Personal history of colon cancer. descending colon 1997. Resected.     Colonic blockages x 2. Twisted bowels. surgically repaired.     Joint pain. Joint swelling. Knees, shoulder, hands. Osteoarthritis.  No raynaud's. No hypermobility. Has seen rheumatologist in the past.     Sciatica. On gabapentin 400 mg BID PRN. Previously followed by neurology. Currently followed by PCP.     Hypokalemia. On potassium 10 mEq daily.     Denies dysphagia,  nausea, vomiting, early satiety, diarrhea,  BRBPR, melena, weight loss, anxiety/depression, insomnia.       Total visit time was 40 minutes, more than 50% of which was spent in face-to-face counseling with patient regarding symptoms, diagnostic results, prognosis, risks and benefits of treatment options, instructions for management, importance of compliance with chosen treatment options, risk factor reduction, stress reduction, coping strategies.      Previous Studies:   ARM 2/11/19: Abnormal coordination of muscle during defecation .Hyposensensitivity of rectum   MRI defecogram 2/13/19: incomplete evacuation, cystocele,  pathological descent of vaginal apex, sigmoidocele.   Colon 5/10/18: GPTC. 6 mm AC polyp(fecal material). 9 mm AC polyp (TA), 3mm AC polyp (TA). 3 mm AC polyp(TA). Small IH. Rpt in 1 year.  GES 4/10/2017: nl. T ½ 75 min  *KUB 12/2016: unremarkable  *Colon 5/26/15: s/p surgery in [place]. 7 mm TC polyp (?). 4 mm SC polyp(?).   *Colon 10/28/14: 1.5 cm AC polyp(?). 5 mm TC polyp (?). 2.5 cm TC polyp (TVA). IH.     *per referring provider report    Labs:   6/1/15: hgb nl 13.5    Relevant Surgeries:  Colonic resection 2/2 colon CA (1997)      ROS:  ROS   Constitutional: No fevers, no chills, no night sweats, no weight loss  ENT: No congestion, no rhinorrhea, + chronic sinus problems  CV: No chest pain, no palpitations  Pulm: no cough, no shortness of breath  Ophtho: no blurry vision, no eye redness  GI: see HPI  Derm: No rash  Heme: No lymphadenopathy, no bruising  MSK: no joint pain, no joint swelling, no Raynauds  : No dysuria, no frequent urination, no blood in urine  Endo: No hot or cold intolerance  Neuro: No dizziness, no syncope, no seizure  Psych: No anxiety, no depression        Medical History:   Past Medical History:   Diagnosis Date    Acute exacerbation of bronchiectasis     Anemia     Anxiety     Asthma     Chronic constipation     Colon cancer 1997    no chemo/rad    Diabetes     not insulin dependent     High blood pressure     Hypercholesterolemia     Hyperlipidemia     OA (osteoarthritis) of knee     Personal history of colonic polyps         Surgical History:   Past Surgical History:   Procedure Laterality Date    BACK SURGERY      x2    bladder lift      bowel blockage      x2    CARDIAC CATHETERIZATION      COLECTOMY      COLONOSCOPY      HYSTERECTOMY      MANOMETRY, ANORECTAL N/A 2/11/2019    Performed by B[facility] at [facility] ([facility])    TOTAL KNEE ARTHROPLASTY Right     TOTAL SHOULDER ARTHROPLASTY Right         Family History:   Family History   Problem Relation Age  of Onset    Prostate cancer Father     Arthritis Mother     Cancer Maternal Aunt     Celiac disease Neg Hx     Cirrhosis Neg Hx     Colon cancer Neg Hx     Colon polyps Neg Hx     Crohn's disease Neg Hx     Cystic fibrosis Neg Hx     Esophageal cancer Neg Hx     Hemochromatosis Neg Hx     Inflammatory bowel disease Neg Hx     Irritable bowel syndrome Neg Hx     Liver cancer Neg Hx     Liver disease Neg Hx     Rectal cancer Neg Hx     Stomach cancer Neg Hx     Ulcerative colitis Neg Hx     Westley's disease Neg Hx     Lymphoma Neg Hx     Tuberculosis Neg Hx     Scleroderma Neg Hx     Rheum arthritis Neg Hx     Multiple sclerosis Neg Hx     Melanoma Neg Hx     Lupus Neg Hx     Psoriasis Neg Hx     Skin cancer Neg Hx         Social History:   Social History     Socioeconomic History    Marital status:      Spouse name: Not on file    Number of children: Not on file    Years of education: Not on file    Highest education level: Not on file   Occupational History    Not on file   Social Needs    Financial resource strain: Not on file    Food insecurity:     Worry: Not on file     Inability: Not on file    Transportation needs:     Medical: Not on file     Non-medical: Not on file   Tobacco Use    Smoking status: Former Smoker    Smokeless tobacco: Never Used    Tobacco comment: quit in 2000   Substance and Sexual Activity    Alcohol use: No     Frequency: Never    Drug use: No    Sexual activity: Not on file   Lifestyle    Physical activity:     Days per week: Not on file     Minutes per session: Not on file    Stress: Not on file   Relationships    Social connections:     Talks on phone: Not on file     Gets together: Not on file     Attends [employer] service: Not on file     Active member of club or organization: Not on file     Attends meetings of clubs or organizations: Not on file     Relationship status: Not on file   Other Topics Concern    Not on file   Social  History Narrative    Not on file        Review of patient's allergies indicates:  Allergies not on file    Current Outpatient Medications   Medication Sig Dispense Refill    albuterol sulfate (VENTOLIN HFA INHL) Inhale into the lungs.      gabapentin (NEURONTIN) 400 MG capsule Take 400 mg by mouth 3 (three) times daily.      glimepiride (AMARYL) 2 MG tablet TK 2 TS PO BID  1    LANCETS MISC 1 lancet by Misc.(Non-Drug; Combo Route) route once daily.      levalbuterol (XOPENEX) 1.25 mg/3 mL nebulizer solution U 3 ML VIA NEB Q 8 H  6    lubiprostone (AMITIZA) 24 MCG Cap Take 1 capsule (24 mcg total) by mouth 2 (two) times daily with meals. 180 capsule 3    metoprolol succinate (TOPROL-XL) 25 MG 24 hr tablet TK 1 T PO BID  3    nebulizer accessories Kit by Misc.(Non-Drug; Combo Route) route.      olmesartan-hydrochlorothiazide (BENICAR HCT) 40-25 mg per tablet TK 1 T PO QD  1    ONETOUCH ULTRASOFT LANCETS MISC 1 lancet by Misc.(Non-Drug; Combo Route) route 3 (three) times daily.      ONETOUCH VERIO Strp TEST TID AS DIRECTED  1    potassium chloride (MICRO-K) 10 MEQ CpSR TK ONE C PO  QD  5    rosuvastatin (CRESTOR) 10 MG tablet TK 1 T PO D  2    SITagliptin (JANUVIA) 100 MG Tab Take 100 mg by mouth once daily.      cyclobenzaprine (FLEXERIL) 10 MG tablet Take 10 mg by mouth 3 (three) times daily as needed for Muscle spasms.      esomeprazole (NEXIUM) 40 MG capsule Take 1 capsule (40 mg total) by mouth 2 (two) times daily before meals. 60 capsule 6    fluticasone (FLONASE) 50 mcg/actuation nasal spray SHAKE LQ AND U 2 SPRAYS IEN D  5    montelukast (SINGULAIR) 10 mg tablet TK 1 T PO D  5    multivit-min/iron/folic/lutein (CENTRUM SILVER WOMEN ORAL) Take by mouth.      ondansetron (ZOFRAN) 4 MG tablet TK 1 T PO TID PRF NAUSEA  1    oxyCODONE-acetaminophen (PERCOCET) 5-325 mg per tablet Take 1 tablet by mouth every 4 (four) hours as needed for Pain.       No current facility-administered medications  "for this visit.         Objective Findings:  Vital Signs:  /77   Pulse 66   Ht 5' 7" (1.702 m)   Wt 97.6 kg (215 lb 2.7 oz)   BMI 33.70 kg/m²   Body mass index is 33.7 kg/m².    Physical Exam:  General appearance: alert, cooperative, no distress  HENT: Normocephalic, atraumatic, neck symmetrical, no nasal discharge  Eyes: conjunctivae/corneas clear, PERRL, EOM's intact  Lungs: clear to auscultation bilaterally, no dullness to percussion bilaterally  Heart: regular rate and rhythm without rub; no displacement of the PMI  Abdomen: soft, non-tender; bowel sounds normoactive; no organomegaly  Extremities: extremities symmetric; no clubbing, cyanosis, or edema  Integument: Skin color, texture, turgor normal; no rashes; hair distrubution normal  Neurologic: Alert and oriented X 3, normal strength, normal coordination and gait  Psychiatric: no pressured speech; normal affect; no evidence of impaired cognition      Labs:  Lab Results   Component Value Date    WBC 6.21 02/04/2019    HGB 12.4 02/04/2019    HCT 40.3 02/04/2019    MCV 95 02/04/2019     02/04/2019     Lab Results   Component Value Date    FERRITIN 241 02/04/2019     Lab Results   Component Value Date     02/04/2019    K 3.8 02/04/2019     02/04/2019    CO2 27 02/04/2019     (H) 02/04/2019    BUN 17 02/04/2019    CREATININE 0.9 02/04/2019    CALCIUM 10.2 02/04/2019    PROT 8.0 02/04/2019    ALBUMIN 4.3 02/04/2019    BILITOT 1.3 (H) 02/04/2019    ALKPHOS 85 02/04/2019    AST 23 02/04/2019    ALT 21 02/04/2019     Lab Results   Component Value Date    TSH 1.109 02/04/2019     No results found for: SEDRATE  No results found for: CRP  No results found for: LABA1C, HGBA1C        Assessment and Plan:  Louisa Rowley is a 68 y.o. female with a PMH of anemia, asthma, anxiety, colon CA s/p resection in 1997, DM 2, HTN, HLD referred to motility clinic for second opinion regarding the following problems:    GERD. Belch. Regurgitation. "   Well controlled with nexium 40 mg BID    Chest pain. Associated with bloating after meals.   Follows with cardiology. EKG, labs, Stress test negative. Will be having ECHO soon.   -Some improvement with nexium 40 mg BID. Cpntinue  -Has not tried nifedipine, diltiazem, isosorbide, peppermint oil, sildenafil, trazodone, botox.      Abdominal pain.  Cramping if taking laxatives for severe constipation. And generalized pain associated with constipation.   -Start OTC IBgard  -Continue amitiza 24 mcg BID    DM 2.  BS running 140s. Last A1c 7.3 on friday. On Januvia 100 mg daily and glimepiride 2 mg BID. Followed by PCP.    Gas and bloating. w distention. Resolved.   Avoids lactose and artificial sugars    Constipation.  Labs unrevealing. Rectal exam with evidence of anorectal dysfunction. ARM revealing pelvic floor dysfunction and hyposensitivity of the rectum. MRI defecogram revealing incomplete evacuation, cystocele, pathological descent of vaginal apex, sigmoidocele.   Unable to tolerate dulcolax d/t abd cramping  No improvement with miralax once daily  No improvement with Amitiza 16 mcg BID  Amitiza 24 mcg BID lost efficacy  No improvement with Trulance 3 mg daily x 2 weeks  No improvement with lactulose  No improvement with colace  Some improvement with Linzess 290 mcg daily  -Continue Amitiza 24 mcg BID and add Miralax 1-4 times daily.  -Proceed with pelvic floor muscle retraining and biofeedback as previously scheduled.  -Has not tried fiber supplement, senna  -Schedule SmartPill    History of anxiety. No problems with it any longer. Has not seen psych.     Anemia. On MV with iron. Labs with normal HGB.    Personal history of colon cancer. descending colon 1997. Resected. History of advanced polyps.  -Colonoscopy due in 5/2019. PT request to be done w Dr. Jimenez. Order placed     Colonic blockages x 2. Twisted bowels. surgically repaired. PT concerned that colon has blockage again.  -Check abdominal xray  today.    Joint pain. Joint swelling. Knees, shoulder, hands. Osteoarthritis.  No raynaud's. No hypermobility. Has seen rheumatologist in the past.     Sciatica. On gabapentin 400 mg BID PRN. Previously followed by neurology. Currently followed by PCP.     Hypokalemia. On potassium 10 mEq daily.     Elevated bilirubin on labs in 2/2019.  PT reports referring GI Dr. Marie rechecked and it was normal.      Follow up in about 3 months (around 8/1/2019) for Motility w Dr. Jimenez.    1. Screen for colon cancer    2. Constipation, unspecified constipation type    3. Generalized abdominal pain          Order summary:  Orders Placed This Encounter    X-Ray Abdomen Flat And Erect    Case request GI: COLONOSCOPY         Thank you so much for allowing me to participate in the care of Louisa Perera, ILA, FNP-C

## 2019-05-01 NOTE — PROGRESS NOTES
SmartPill Check list reviewed with patient.   PT denies having a swallowing disorder, or severe dysphagia to food or pills  PT denies having an implanted or portable electro-mechanical device  PT denies  having a physiological/mechanical GI obstruction, stricture or fistula  PT denies having a Bezoar or history of bezoars (a mass of un-digested material staying in the stomach)  PT denies  having Crohns disease or Diverticulitis  PT denies having GI surgery within the past 3 months  PT is NOT scheduled for an MRI within the next 2 weeks  PT has STOPPED using PPIs within the last 7 days  PT has STOPPED using H2 Blockers within the last 2 days  PT has  STOPPED using anti-diarrheal agents, laxatives and promotility drugs  PT has  STOPPED using Antacids within the last day  PT is  fasting and have had nothing by food or mouth since 12 Midnight  PT verbalizes understanding that the SmartPill it is a single-use capsule that is swallowed,  travels through the GI tract and wirelessly transmits data about the GI tract to a recorder worn on a belt clip or pouch.  Instructed patient to stop Amitiza and Miralax 2 days prior to procedure. Patient reports not taking nexium, flexeril, and percocet. Instructed to stay off of these meds at least 7 days prior to procedure. Patient instruction packet reviewed and given to patient with date/time of scheduled procedure and contact number for any questions. Patient verbalized understanding.

## 2019-05-06 ENCOUNTER — TELEPHONE (OUTPATIENT)
Dept: GASTROENTEROLOGY | Facility: CLINIC | Age: 69
End: 2019-05-06

## 2019-05-06 NOTE — TELEPHONE ENCOUNTER
Spoke with patient. Verified that she has been off Nexium, flexeril, and percocet 5/1/19, instructed to stop Amitiza, Zofran, and Miralax today. Patient verbalized understanding. Appointment date and time verified with patient. Instructed to call with any questions. Number given to patient.

## 2019-05-07 ENCOUNTER — TELEPHONE (OUTPATIENT)
Dept: GASTROENTEROLOGY | Facility: CLINIC | Age: 69
End: 2019-05-07

## 2019-05-07 NOTE — TELEPHONE ENCOUNTER
Pt had questions reg food diary. I informed pt she would not receive that until she comes in and that diary will be further explained in visit. Pt verbalized understanding.

## 2019-05-07 NOTE — TELEPHONE ENCOUNTER
----- Message from Lena Evaristo sent at 5/7/2019  3:39 PM CDT -----  Contact: self   randi    Needs Advice    Reason for call: has questions re her appts        Communication Preference:  [phone]  Additional Information:

## 2019-05-08 ENCOUNTER — OFFICE VISIT (OUTPATIENT)
Dept: GASTROENTEROLOGY | Facility: CLINIC | Age: 69
End: 2019-05-08
Payer: MEDICARE

## 2019-05-08 ENCOUNTER — TELEPHONE (OUTPATIENT)
Dept: GASTROENTEROLOGY | Facility: CLINIC | Age: 69
End: 2019-05-08

## 2019-05-08 VITALS
DIASTOLIC BLOOD PRESSURE: 79 MMHG | BODY MASS INDEX: 33.8 KG/M2 | HEIGHT: 67 IN | SYSTOLIC BLOOD PRESSURE: 130 MMHG | WEIGHT: 215.38 LBS | HEART RATE: 72 BPM

## 2019-05-08 DIAGNOSIS — K59.09 CONSTIPATION, CHRONIC: Primary | ICD-10-CM

## 2019-05-08 PROCEDURE — 99214 OFFICE O/P EST MOD 30 MIN: CPT | Mod: PBBFAC | Performed by: NURSE PRACTITIONER

## 2019-05-08 PROCEDURE — 99999 PR PBB SHADOW E&M-EST. PATIENT-LVL IV: CPT | Mod: PBBFAC,,, | Performed by: NURSE PRACTITIONER

## 2019-05-08 PROCEDURE — 99499 UNLISTED E&M SERVICE: CPT | Mod: S$PBB,,, | Performed by: NURSE PRACTITIONER

## 2019-05-08 PROCEDURE — 99999 PR PBB SHADOW E&M-EST. PATIENT-LVL IV: ICD-10-PCS | Mod: PBBFAC,,, | Performed by: NURSE PRACTITIONER

## 2019-05-08 PROCEDURE — 99499 NO LOS: ICD-10-PCS | Mod: S$PBB,,, | Performed by: NURSE PRACTITIONER

## 2019-05-08 NOTE — PROGRESS NOTES
[facility] Consultation Note    Reason for Consult:    Chief Complaint   Patient presents with    Chest Pain    Gas    Bloated    Procedure     smart pill          PCP:   Kesha Perera       Referring MD:  Dr. Bimal Marie      HPI:  Louisa Rowley is a 68 y.o. female with a PMH of anemia, asthma, anxiety, colon CA s/p resection in 1997, DM 2, HTN, HLD here today for smartPill.    SmartPill Check list  PT denies having a swallowing disorder, or severe dysphagia to food or pills  PT denies having an implanted or portable electro-mechanical device  PT denies  having a physiological/mechanical GI obstruction, stricture or fistula  PT denies having a Bezoar or history of bezoars (a mass of un-digested material staying in the stomach)  PT denies  having Crohns disease or Diverticulitis  PT denies having GI surgery within the past 3 months  PT is NOT scheduled for an MRI within the next 2 weeks  PT has STOPPED using PPIs within the last 7 days  PT has STOPPED using H2 Blockers within the last 2 days  PT has  STOPPED using anti-diarrheal agents, laxatives and promotility drugs  PT has  STOPPED using Antacids within the last day  PT is  fasting and have had nothing by food or mouth since 12 Midnight  PT verbalizes understanding that the SmartPill it is a single-use capsule that is swallowed,  travels through the GI tract and wirelessly transmits data about the GI tract to a recorder worn on a belt clip or pouch.  PT verbalizes understanding that there exists a 1% risk of capsule retention in the small bowel which may require surgery if the capsule does not pass out of the body within 2 weeks.    Constipation.  Reports bothersome constipation:  Lumpy and hard, difficult to pass stools:bristol type 1,   Urgency and sensation of incomplete evacuation: yes  Straining during defecation:no  Sensation of anorectal blockage:sometimes  Rectal prolapse:no  Fewer than three spontaneous bowel  movements per week:yes  Frequency:1 day weekly  Symptoms started: since childhood with progressive worsening after colon ca in 1997  Uses manual maneuvers to facilitate defecation:no    Problems in early childhood: as a young child  History of perineal trauma: no    Previous evaluation: no  Previous pelvic muscle retraining: no    Amitiza 24 mcg BID lost efficacy after a few months.  No improvement with miralax once daily  Some improvement with Linzess 290 mcg daily unless going out    No improvement with Amitiza 16 mcg BID  No improvement with Trulance 3 mg daily x 2 weeks  Unable to tolerate dulcolax d/t abd cramping  No improvement with lactulose  Has no tried senna  No improvement with colace  Has not tried fiber supplement      Previous Studies:   ARM 2/11/19: Abnormal coordination of muscle during defecation .Hyposensensitivity of rectum   MRI defecogram 2/13/19: incomplete evacuation, cystocele, pathological descent of vaginal apex, sigmoidocele.   Colon 5/10/18: GPTC. 6 mm AC polyp(fecal material). 9 mm AC polyp (TA), 3mm AC polyp (TA). 3 mm AC polyp(TA). Small IH. Rpt in 1 year.  GES 4/10/2017: nl. T ½ 75 min  *KUB 12/2016: unremarkable  *Colon 5/26/15: s/p surgery in [place]. 7 mm TC polyp (?). 4 mm SC polyp(?).   *Colon 10/28/14: 1.5 cm AC polyp(?). 5 mm TC polyp (?). 2.5 cm TC polyp (TVA). IH.     *per referring provider report    Labs:   6/1/15: hgb nl 13.5    Relevant Surgeries:  Colonic resection 2/2 colon CA (1997)      ROS:  ROS   Constitutional: No fevers, no chills, no night sweats, no weight loss  ENT: No congestion, no rhinorrhea, + chronic sinus problems  CV: No chest pain, no palpitations  Pulm: no cough, no shortness of breath  Ophtho: no blurry vision, no eye redness  GI: see HPI  Derm: No rash  Heme: No lymphadenopathy, no bruising  MSK: no joint pain, no joint swelling, no Raynauds  : No dysuria, no frequent urination, no blood in urine  Endo: No hot or cold intolerance  Neuro: No dizziness,  no syncope, no seizure  Psych: No anxiety, no depression        Medical History:   Past Medical History:   Diagnosis Date    Acute exacerbation of bronchiectasis     Anemia     Anxiety     Asthma     Chronic constipation     Colon cancer 1997    no chemo/rad    Diabetes     not insulin dependent     High blood pressure     Hypercholesterolemia     Hyperlipidemia     OA (osteoarthritis) of knee     Personal history of colonic polyps         Surgical History:   Past Surgical History:   Procedure Laterality Date    BACK SURGERY      x2    bladder lift      bowel blockage      x2    CARDIAC CATHETERIZATION      COLECTOMY      COLONOSCOPY      HYSTERECTOMY      MANOMETRY, ANORECTAL N/A 2/11/2019    Performed by Bety Jimenez MD at [facility] ([place] [facility])    TOTAL KNEE ARTHROPLASTY Right     TOTAL SHOULDER ARTHROPLASTY Right         Family History:   Family History   Problem Relation Age of Onset    Prostate cancer Father     Arthritis Mother     Cancer Maternal Aunt     Celiac disease Neg Hx     Cirrhosis Neg Hx     Colon cancer Neg Hx     Colon polyps Neg Hx     Crohn's disease Neg Hx     Cystic fibrosis Neg Hx     Esophageal cancer Neg Hx     Hemochromatosis Neg Hx     Inflammatory bowel disease Neg Hx     Irritable bowel syndrome Neg Hx     Liver cancer Neg Hx     Liver disease Neg Hx     Rectal cancer Neg Hx     Stomach cancer Neg Hx     Ulcerative colitis Neg Hx     Westley's disease Neg Hx     Lymphoma Neg Hx     Tuberculosis Neg Hx     Scleroderma Neg Hx     Rheum arthritis Neg Hx     Multiple sclerosis Neg Hx     Melanoma Neg Hx     Lupus Neg Hx     Psoriasis Neg Hx     Skin cancer Neg Hx         Social History:   Social History     Socioeconomic History    Marital status:      Spouse name: Not on file    Number of children: Not on file    Years of education: Not on file    Highest education level: Not on file   Occupational History    Not on file    Social Needs    Financial resource strain: Not on file    Food insecurity:     Worry: Not on file     Inability: Not on file    Transportation needs:     Medical: Not on file     Non-medical: Not on file   Tobacco Use    Smoking status: Former Smoker    Smokeless tobacco: Never Used    Tobacco comment: quit in 2000   Substance and Sexual Activity    Alcohol use: No     Frequency: Never    Drug use: No    Sexual activity: Not on file   Lifestyle    Physical activity:     Days per week: Not on file     Minutes per session: Not on file    Stress: Not on file   Relationships    Social connections:     Talks on phone: Not on file     Gets together: Not on file     Attends Faith service: Not on file     Active member of club or organization: Not on file     Attends meetings of clubs or organizations: Not on file     Relationship status: Not on file   Other Topics Concern    Not on file   Social History Narrative    Not on file        Review of patient's allergies indicates:  Allergies not on file    Current Outpatient Medications   Medication Sig Dispense Refill    albuterol sulfate (VENTOLIN HFA INHL) Inhale into the lungs.      cyclobenzaprine (FLEXERIL) 10 MG tablet Take 10 mg by mouth 3 (three) times daily as needed for Muscle spasms.      fluticasone (FLONASE) 50 mcg/actuation nasal spray SHAKE LQ AND U 2 SPRAYS IEN D  5    gabapentin (NEURONTIN) 400 MG capsule Take 400 mg by mouth 3 (three) times daily.      glimepiride (AMARYL) 2 MG tablet TK 2 TS PO BID  1    LANCETS MISC 1 lancet by Misc.(Non-Drug; Combo Route) route once daily.      levalbuterol (XOPENEX) 1.25 mg/3 mL nebulizer solution U 3 ML VIA NEB Q 8 H  6    metoprolol succinate (TOPROL-XL) 25 MG 24 hr tablet TK 1 T PO BID  3    montelukast (SINGULAIR) 10 mg tablet TK 1 T PO D  5    multivit-min/iron/folic/lutein (CENTRUM SILVER WOMEN ORAL) Take by mouth.      nebulizer accessories Kit by Misc.(Non-Drug; Combo Route) route.    "   olmesartan-hydrochlorothiazide (BENICAR HCT) 40-25 mg per tablet TK 1 T PO QD  1    ondansetron (ZOFRAN) 4 MG tablet TK 1 T PO TID PRF NAUSEA  1    ONETOUCH ULTRASOFT LANCETS MISC 1 lancet by Misc.(Non-Drug; Combo Route) route 3 (three) times daily.      ONETOUCH VERIO Strp TEST TID AS DIRECTED  1    oxyCODONE-acetaminophen (PERCOCET) 5-325 mg per tablet Take 1 tablet by mouth every 4 (four) hours as needed for Pain.      potassium chloride (MICRO-K) 10 MEQ CpSR TK ONE C PO  QD  5    rosuvastatin (CRESTOR) 10 MG tablet TK 1 T PO D  2    SITagliptin (JANUVIA) 100 MG Tab Take 100 mg by mouth once daily.      esomeprazole (NEXIUM) 40 MG capsule Take 1 capsule (40 mg total) by mouth 2 (two) times daily before meals. 60 capsule 6     No current facility-administered medications for this visit.         Objective Findings:  Vital Signs:  /79   Pulse 72   Ht 5' 7" (1.702 m)   Wt 97.7 kg (215 lb 6.2 oz)   BMI 33.73 kg/m²   Body mass index is 33.73 kg/m².    Physical Exam:  General appearance: alert, cooperative, no distress  HENT: Normocephalic, atraumatic, neck symmetrical, no nasal discharge  Eyes: conjunctivae/corneas clear, PERRL, EOM's intact  Lungs: clear to auscultation bilaterally, no dullness to percussion bilaterally  Heart: regular rate and rhythm without rub; no displacement of the PMI  Abdomen: soft, non-tender; bowel sounds normoactive; no organomegaly  Extremities: extremities symmetric; no clubbing, cyanosis, or edema  Integument: Skin color, texture, turgor normal; no rashes; hair distrubution normal  Neurologic: Alert and oriented X 3, normal strength, normal coordination and gait  Psychiatric: no pressured speech; normal affect; no evidence of impaired cognition      Labs:  Lab Results   Component Value Date    WBC 6.21 02/04/2019    HGB 12.4 02/04/2019    HCT 40.3 02/04/2019    MCV 95 02/04/2019     02/04/2019     Lab Results   Component Value Date    FERRITIN 241 02/04/2019 "     Lab Results   Component Value Date     02/04/2019    K 3.8 02/04/2019     02/04/2019    CO2 27 02/04/2019     (H) 02/04/2019    BUN 17 02/04/2019    CREATININE 0.9 02/04/2019    CALCIUM 10.2 02/04/2019    PROT 8.0 02/04/2019    ALBUMIN 4.3 02/04/2019    BILITOT 1.3 (H) 02/04/2019    ALKPHOS 85 02/04/2019    AST 23 02/04/2019    ALT 21 02/04/2019     Lab Results   Component Value Date    TSH 1.109 02/04/2019     No results found for: SEDRATE  No results found for: CRP  No results found for: LABA1C, HGBA1C        Assessment and Plan:  Louisa Rowley is a 68 y.o. female with a PMH of anemia, asthma, anxiety, colon CA s/p resection in 1997, DM 2, HTN, HLD here for [employer].    PT consented for Previstarll today.    Constipation.  Labs unrevealing. Rectal exam with evidence of anorectal dysfunction. ARM revealing pelvic floor dysfunction and hyposensitivity of the rectum. MRI defecogram revealing incomplete evacuation, cystocele, pathological descent of vaginal apex, sigmoidocele.   Unable to tolerate dulcolax d/t abd cramping  No improvement with miralax once daily  No improvement with Amitiza 16 mcg BID  Amitiza 24 mcg BID lost efficacy  No improvement with Trulance 3 mg daily x 2 weeks  No improvement with lactulose  No improvement with colace  Some improvement with Linzess 290 mcg daily  -Continue Amitiza 24 mcg BID and add Miralax 1-4 times daily.  -Proceed with pelvic floor muscle retraining and biofeedback as previously scheduled.  -Has not tried fiber supplement, senna      F/u in motility clinic as previously advised.      1. Constipation, chronic          Order summary:         Thank you so much for allowing me to participate in the care of Louisa Rwoley          Kesha Perera, ILA, FNP-C

## 2019-05-08 NOTE — TELEPHONE ENCOUNTER
Patient reports that she forgot to hit event button and write down when she took a nap. Instructed to write the times down now but not to hit event button. Reminded to use event button and event diary when eating, sleeping, waking, and bowel movements. Verbalized understanding.

## 2019-05-08 NOTE — TELEPHONE ENCOUNTER
----- Message from Teresa Acevedo MA sent at 5/8/2019  3:44 PM CDT -----  Contact: pt#[phone]      ----- Message -----  From: Andreea Cardona  Sent: 5/8/2019   3:29 PM  To: Dilma Rebolledo Staff    Needs Advice    Reason for call:She wants to speak with Milana about smart pill         Communication Preference:    Additional Information:

## 2019-05-08 NOTE — PROGRESS NOTES
0828 Consent understanding verified and witnessed.   0830 Instructed patient on purpose of smartbar and verfied allergies. Patient verbalized understanding. Smartbar given to patient. Smart pill [place] is [id number], LOT is [id number] , Pressure Saran code is [id number], and expiration date is 02/15/20. Smartpill calibrated without difficulty.  0842 Smartbar completed. Smartpill ingested without difficulty and event button pressed to dominick ingestion.   0845 Patient instructions, recorder use and event diary, stool diary, and food diary reviewed with patient. All questions answered. Patient may resume normal diet at 2:45 pm, documented on patient instruction packet. Recorder to be returned on 5/14/19 before noon, documented on patient instruction packet.Contact name and phone number written on patient instruction packet. Patient verbalized understanding.

## 2019-05-14 ENCOUNTER — TELEPHONE (OUTPATIENT)
Dept: ENDOSCOPY | Facility: HOSPITAL | Age: 69
End: 2019-05-14

## 2019-05-14 NOTE — TELEPHONE ENCOUNTER
Pt called to schedule colonoscopy.  Pt states that she was seen in ER at [place] on 5/13/19 with coughing up blood.  Pt sees Dr. Alberto Mckee, pulmonologist, in [place].  Request faxed for pulmonary clearance and most recent clinic visit.  [phone] (fax [phone])

## 2019-05-15 NOTE — TELEPHONE ENCOUNTER
Received pulmonary clinic note from Dr. Mckee dated 5/13/19.  Dr. Mckee recommends that pt not undergo colonoscopy at this time.  See  tab dated 5/15/19.

## 2019-05-16 NOTE — TELEPHONE ENCOUNTER
Pulm recommends that we hold off on colonoscopy for now. PLs let her know.  PT should fu w me next avail.

## 2019-05-20 ENCOUNTER — TELEPHONE (OUTPATIENT)
Dept: GASTROENTEROLOGY | Facility: CLINIC | Age: 69
End: 2019-05-20

## 2019-05-20 NOTE — TELEPHONE ENCOUNTER
----- Message from Uma Luciano sent at 5/20/2019  9:36 AM CDT -----  Contact: Self- [phone]  Barbara- pt returning missed call to discuss holding of c-scope- please contact pt at [phone]

## 2019-05-20 NOTE — TELEPHONE ENCOUNTER
Pt notified that colonoscopy on hold for now due in reg to pulm. Pt placed on wait list for ext available w .

## 2019-05-27 ENCOUNTER — TELEPHONE (OUTPATIENT)
Dept: GASTROENTEROLOGY | Facility: CLINIC | Age: 69
End: 2019-05-27

## 2019-05-31 ENCOUNTER — TELEPHONE (OUTPATIENT)
Dept: GASTROENTEROLOGY | Facility: CLINIC | Age: 69
End: 2019-05-31

## 2019-05-31 NOTE — TELEPHONE ENCOUNTER
Smart Pill Results  -Normal gastric emptying   -Normal small bowel   -Delayed colonic transit   -Delayed whole gut transit   -Capsule exited the body    Manometry Results:    Please let patient know that the Smart pill shows  -Normal gastric emptying   -Normal small bowel transit  -Delayed colonic transit     Recommendation:  Follow up as previously indicated

## 2019-06-07 ENCOUNTER — LAB VISIT (OUTPATIENT)
Dept: LAB | Facility: HOSPITAL | Age: 69
End: 2019-06-07
Attending: INTERNAL MEDICINE
Payer: MEDICARE

## 2019-06-07 ENCOUNTER — TELEPHONE (OUTPATIENT)
Dept: GASTROENTEROLOGY | Facility: CLINIC | Age: 69
End: 2019-06-07

## 2019-06-07 ENCOUNTER — OFFICE VISIT (OUTPATIENT)
Dept: GASTROENTEROLOGY | Facility: CLINIC | Age: 69
End: 2019-06-07
Payer: MEDICARE

## 2019-06-07 ENCOUNTER — CLINICAL SUPPORT (OUTPATIENT)
Dept: REHABILITATION | Facility: HOSPITAL | Age: 69
End: 2019-06-07
Attending: INTERNAL MEDICINE
Payer: MEDICARE

## 2019-06-07 VITALS
DIASTOLIC BLOOD PRESSURE: 84 MMHG | HEART RATE: 69 BPM | SYSTOLIC BLOOD PRESSURE: 156 MMHG | HEIGHT: 67 IN | WEIGHT: 214.31 LBS | BODY MASS INDEX: 33.64 KG/M2

## 2019-06-07 DIAGNOSIS — K59.00 CONSTIPATION, UNSPECIFIED CONSTIPATION TYPE: ICD-10-CM

## 2019-06-07 DIAGNOSIS — R79.89 ELEVATED LFTS: Primary | ICD-10-CM

## 2019-06-07 DIAGNOSIS — K62.5 BRBPR (BRIGHT RED BLOOD PER RECTUM): ICD-10-CM

## 2019-06-07 DIAGNOSIS — R79.89 ELEVATED LFTS: ICD-10-CM

## 2019-06-07 DIAGNOSIS — M62.9 DISORDER OF MUSCLE: ICD-10-CM

## 2019-06-07 DIAGNOSIS — K21.9 GASTROESOPHAGEAL REFLUX DISEASE, ESOPHAGITIS PRESENCE NOT SPECIFIED: ICD-10-CM

## 2019-06-07 DIAGNOSIS — R14.0 BLOATING: ICD-10-CM

## 2019-06-07 DIAGNOSIS — R10.9 ABDOMINAL PAIN, UNSPECIFIED ABDOMINAL LOCATION: ICD-10-CM

## 2019-06-07 LAB
ALBUMIN SERPL BCP-MCNC: 4.1 G/DL (ref 3.5–5.2)
ALP SERPL-CCNC: 85 U/L (ref 55–135)
ALT SERPL W/O P-5'-P-CCNC: 18 U/L (ref 10–44)
ANION GAP SERPL CALC-SCNC: 10 MMOL/L (ref 8–16)
AST SERPL-CCNC: 16 U/L (ref 10–40)
BILIRUB DIRECT SERPL-MCNC: 0.6 MG/DL (ref 0.1–0.3)
BILIRUB SERPL-MCNC: 1.4 MG/DL (ref 0.1–1)
BUN SERPL-MCNC: 18 MG/DL (ref 8–23)
CALCIUM SERPL-MCNC: 9.9 MG/DL (ref 8.7–10.5)
CHLORIDE SERPL-SCNC: 106 MMOL/L (ref 95–110)
CO2 SERPL-SCNC: 25 MMOL/L (ref 23–29)
CREAT SERPL-MCNC: 1 MG/DL (ref 0.5–1.4)
EST. GFR  (AFRICAN AMERICAN): >60 ML/MIN/1.73 M^2
EST. GFR  (NON AFRICAN AMERICAN): 58.4 ML/MIN/1.73 M^2
GLUCOSE SERPL-MCNC: 194 MG/DL (ref 70–110)
HBV CORE AB SERPL QL IA: NEGATIVE
HBV SURFACE AG SERPL QL IA: NEGATIVE
HCV AB SERPL QL IA: NEGATIVE
HEPATITIS A ANTIBODY, IGG: NEGATIVE
POTASSIUM SERPL-SCNC: 4 MMOL/L (ref 3.5–5.1)
PROT SERPL-MCNC: 7.5 G/DL (ref 6–8.4)
SODIUM SERPL-SCNC: 141 MMOL/L (ref 136–145)

## 2019-06-07 PROCEDURE — 99214 OFFICE O/P EST MOD 30 MIN: CPT | Mod: PBBFAC | Performed by: INTERNAL MEDICINE

## 2019-06-07 PROCEDURE — 80053 COMPREHEN METABOLIC PANEL: CPT

## 2019-06-07 PROCEDURE — 86790 VIRUS ANTIBODY NOS: CPT

## 2019-06-07 PROCEDURE — 86706 HEP B SURFACE ANTIBODY: CPT

## 2019-06-07 PROCEDURE — 86803 HEPATITIS C AB TEST: CPT

## 2019-06-07 PROCEDURE — 99215 OFFICE O/P EST HI 40 MIN: CPT | Mod: S$PBB,,, | Performed by: INTERNAL MEDICINE

## 2019-06-07 PROCEDURE — 86704 HEP B CORE ANTIBODY TOTAL: CPT

## 2019-06-07 PROCEDURE — 97530 THERAPEUTIC ACTIVITIES: CPT | Mod: PO

## 2019-06-07 PROCEDURE — 99215 PR OFFICE/OUTPT VISIT, EST, LEVL V, 40-54 MIN: ICD-10-PCS | Mod: S$PBB,,, | Performed by: INTERNAL MEDICINE

## 2019-06-07 PROCEDURE — 82248 BILIRUBIN DIRECT: CPT

## 2019-06-07 PROCEDURE — 99999 PR PBB SHADOW E&M-EST. PATIENT-LVL IV: ICD-10-PCS | Mod: PBBFAC,,, | Performed by: INTERNAL MEDICINE

## 2019-06-07 PROCEDURE — 97162 PT EVAL MOD COMPLEX 30 MIN: CPT | Mod: PO

## 2019-06-07 PROCEDURE — 99999 PR PBB SHADOW E&M-EST. PATIENT-LVL IV: CPT | Mod: PBBFAC,,, | Performed by: INTERNAL MEDICINE

## 2019-06-07 PROCEDURE — 87340 HEPATITIS B SURFACE AG IA: CPT

## 2019-06-07 RX ORDER — ESOMEPRAZOLE MAGNESIUM 40 MG/1
40 CAPSULE, DELAYED RELEASE ORAL DAILY
Qty: 30 CAPSULE | Refills: 6 | Status: SHIPPED | OUTPATIENT
Start: 2019-06-07 | End: 2019-09-05

## 2019-06-07 NOTE — PROGRESS NOTES
[facility] Consultation Note    Reason for Consult:    Chief Complaint   Patient presents with    Heartburn    Abdominal Pain    Gas    Bloated    Constipation         PCP:   Kesha Perera       Referring MD:  Dr. Bimal Marie       HPI:  Louisa Rowley is a 68 y.o. female with a PMH of anemia, asthma, anxiety, colon CA s/p resection in 1997, DM 2, HTN, HLD referred to motility clinic for second opinion regarding the following problems:    Had hemoptysis and was unable complete colonoscopy. Now better . Will schedule colonosocpy locally     GERD. Belch. Regurgitation. Well controlled with nexium 40 mg BID.    Chest pain. No longer having.       Abdominal pain. Improved. Now infrequent.   Associated w BMs  Has not tried IBguard yet     Gas and bloating. Still w bothersome gas and bloating   Usues lactopse occasionally   Occasionally uses artificial sugars   Has not tried IBguard yet     Constipation.  Reports bothersome constipation:  Still w constipation.   [place] 6  Frequency:1 day  Takes amitiza 24mg BID and miralax daily    Has not started PT yet    BRBPR. Few episodes of BRBPR, small amount on tissue paper during straining     History of anxiety. Doing well    Anemia. No longer taking iron     Personal history of colon cancer. descending colon 1997. Resected.     Colonic blockages x 2. Twisted bowels. surgically repaired.     Sciatica. On gabapentin PRN - not taking. Previously followed by neurology. Currently followed by PCP.     Hypokalemia. On potassium 10 mEq daily.     Denies dysphagia,  nausea, vomiting, early satiety, diarrhea, melena, weight loss.       Total visit time was 40 minutes, more than 50% of which was spent in face-to-face counseling with patient regarding symptoms, diagnostic results, prognosis, risks and benefits of treatment options, instructions for management, importance of compliance with chosen treatment options, risk factor reduction, stress  reduction, coping strategies.      Previous Studies:   Smart Pill 5/8/2019: Normal gastric emptying.  Normal small bowel.  Delayed colonic transit.  KUB 5/1/2019: negative    Anorectal Manometry 2/11/2019: Normal basal anal sphincter pressure. Adequate squeeze.  Dyssyngergic defecation Type I.  Hyposensitivity in the rectum.  Patient was able to evacuate 50cc balloon.  Possible intussusception noted.  MRDefecography 2/4/2019: There is incomplete evacuation of rectal gel during the examination. Rectal Intussusception: Not identified. 1.5cm AP  Rectocele. Anatomic findings: Status post hysterectomy.  Asymmetric thinning of the left levator musculature.  Incomplete evacuation. 2.    Anterior compartment findings: Grade 2 cystocele with evacuation. 3.    Middle compartment findings: Grade 2 pathologic descent of the vaginal apex with evacuation.   4.    Posterior compartment findings: Low lying anorectal junction at rest with grade 2 widening of the levator hiatus with evacuation.  Additionally, grade 2 peritoneocele and grade 1 sigmoidocele with evacuation.   Colon 5/10/18: GPTC. 6 mm AC polyp(fecal material). 9 mm AC polyp (TA), 3mm AC polyp (TA). 3 mm AC polyp(TA). Small IH. Rpt in 1 year.  GES 4/10/2017: nl. T ½ 75 min  *KUB 12/2016: unremarkable  *Colon 5/26/15: s/p surgery in [place]. 7 mm TC polyp (?). 4 mm SC polyp(?).   *Colon 10/28/14: 1.5 cm AC polyp(?). 5 mm TC polyp (?). 2.5 cm TC polyp (TVA). IH.     *per referring provider report    Labs:   6/1/15: hgb nl 13.5    Relevant Surgeries:  Colonic resection 2/2 colon CA (1997)      ROS:  ROS   Constitutional: No fevers, no chills, + night sweats, no weight loss  ENT: + congestion, no rhinorrhea, + chronic sinus problems  CV: + chest pain, no palpitations  Pulm: + cough, no shortness of breath  Ophtho: + blurry vision, no eye redness  GI: see HPI  Derm: No rash  Heme: No lymphadenopathy, no bruising  MSK: + joint pain, no joint swelling, no Raynauds  : No dysuria,  no frequent urination, no blood in urine  Endo: No hot or cold intolerance  Neuro: No dizziness, no syncope, no seizure  Psych: No anxiety, no depression        Medical History:   Past Medical History:   Diagnosis Date    Acute exacerbation of bronchiectasis     Anemia     Anxiety     Asthma     Chronic constipation     Colon cancer 1997    no chemo/rad    Diabetes     not insulin dependent     High blood pressure     Hypercholesterolemia     Hyperlipidemia     OA (osteoarthritis) of knee     Personal history of colonic polyps         Surgical History:   Past Surgical History:   Procedure Laterality Date    BACK SURGERY      x2    bladder lift      bowel blockage      x2    CARDIAC CATHETERIZATION      COLECTOMY      COLONOSCOPY      HYSTERECTOMY      MANOMETRY, ANORECTAL N/A 2/11/2019    Performed by Bety Jimenez MD at [facility] (4TH FLR)    TOTAL KNEE ARTHROPLASTY Right     TOTAL SHOULDER ARTHROPLASTY Right         Family History:   Family History   Problem Relation Age of Onset    Prostate cancer Father     Arthritis Mother     Cancer Maternal Aunt     Celiac disease Neg Hx     Cirrhosis Neg Hx     Colon cancer Neg Hx     Colon polyps Neg Hx     Crohn's disease Neg Hx     Cystic fibrosis Neg Hx     Esophageal cancer Neg Hx     Hemochromatosis Neg Hx     Inflammatory bowel disease Neg Hx     Irritable bowel syndrome Neg Hx     Liver cancer Neg Hx     Liver disease Neg Hx     Rectal cancer Neg Hx     Stomach cancer Neg Hx     Ulcerative colitis Neg Hx     Westley's disease Neg Hx     Lymphoma Neg Hx     Tuberculosis Neg Hx     Scleroderma Neg Hx     Rheum arthritis Neg Hx     Multiple sclerosis Neg Hx     Melanoma Neg Hx     Lupus Neg Hx     Psoriasis Neg Hx     Skin cancer Neg Hx         Social History:   Social History     Socioeconomic History    Marital status:      Spouse name: Not on file    Number of children: Not on file    Years of education:  Not on file    Highest education level: Not on file   Occupational History    Not on file   Social Needs    Financial resource strain: Not on file    Food insecurity:     Worry: Not on file     Inability: Not on file    Transportation needs:     Medical: Not on file     Non-medical: Not on file   Tobacco Use    Smoking status: Former Smoker    Smokeless tobacco: Never Used    Tobacco comment: quit in 2000   Substance and Sexual Activity    Alcohol use: No     Frequency: Never    Drug use: No    Sexual activity: Not on file   Lifestyle    Physical activity:     Days per week: Not on file     Minutes per session: Not on file    Stress: Not on file   Relationships    Social connections:     Talks on phone: Not on file     Gets together: Not on file     Attends Restorationism service: Not on file     Active member of club or organization: Not on file     Attends meetings of clubs or organizations: Not on file     Relationship status: Not on file   Other Topics Concern    Not on file   Social History Narrative    Not on file        Review of patient's allergies indicates:  Allergies not on file    Current Outpatient Medications   Medication Sig Dispense Refill    albuterol sulfate (VENTOLIN HFA INHL) Inhale into the lungs.      cyclobenzaprine (FLEXERIL) 10 MG tablet Take 10 mg by mouth 3 (three) times daily as needed for Muscle spasms.      fluticasone (FLONASE) 50 mcg/actuation nasal spray SHAKE LQ AND U 2 SPRAYS IEN D  5    gabapentin (NEURONTIN) 400 MG capsule Take 400 mg by mouth 3 (three) times daily.      glimepiride (AMARYL) 2 MG tablet TK 2 TS PO BID  1    LANCETS MISC 1 lancet by Misc.(Non-Drug; Combo Route) route once daily.      levalbuterol (XOPENEX) 1.25 mg/3 mL nebulizer solution U 3 ML VIA NEB Q 8 H  6    metoprolol succinate (TOPROL-XL) 25 MG 24 hr tablet TK 1 T PO BID  3    montelukast (SINGULAIR) 10 mg tablet TK 1 T PO D  5    multivit-min/iron/folic/lutein (CENTRUM SILVER WOMEN  "ORAL) Take by mouth.      nebulizer accessories Kit by Misc.(Non-Drug; Combo Route) route.      olmesartan-hydrochlorothiazide (BENICAR HCT) 40-25 mg per tablet TK 1 T PO QD  1    ondansetron (ZOFRAN) 4 MG tablet TK 1 T PO TID PRF NAUSEA  1    ONETOUCH ULTRASOFT LANCETS MISC 1 lancet by Misc.(Non-Drug; Combo Route) route 3 (three) times daily.      ONETOUCH VERIO Strp TEST TID AS DIRECTED  1    oxyCODONE-acetaminophen (PERCOCET) 5-325 mg per tablet Take 1 tablet by mouth every 4 (four) hours as needed for Pain.      potassium chloride (MICRO-K) 10 MEQ CpSR TK ONE C PO  QD  5    rosuvastatin (CRESTOR) 10 MG tablet TK 1 T PO D  2    SITagliptin (JANUVIA) 100 MG Tab Take 100 mg by mouth once daily.      esomeprazole (NEXIUM) 40 MG capsule Take 1 capsule (40 mg total) by mouth once daily. Take one pill every morning 45 minutes before breakfast 30 capsule 6     No current facility-administered medications for this visit.         Objective Findings:  Vital Signs:  BP (!) 156/84   Pulse 69   Ht 5' 7" (1.702 m)   Wt 97.2 kg (214 lb 4.6 oz)   BMI 33.56 kg/m²   Body mass index is 33.56 kg/m².    Physical Exam:  General appearance: alert, cooperative, no distress  HENT: Normocephalic, atraumatic, neck symmetrical, no nasal discharge  Eyes: conjunctivae/corneas clear, PERRL, EOM's intact  Lungs: clear to auscultation bilaterally, no dullness to percussion bilaterally  Heart: regular rate and rhythm without rub; no displacement of the PMI  Abdomen: soft, non-tender; bowel sounds normoactive; no organomegaly  Extremities: extremities symmetric; no clubbing, cyanosis, or edema  Integument: Skin color, texture, turgor normal; no rashes; hair distrubution normal  Neurologic: Alert and oriented X 3, normal strength, normal coordination and gait  Psychiatric: no pressured speech; normal affect; no evidence of impaired cognition      Labs:  Lab Results   Component Value Date    WBC 6.21 02/04/2019    HGB 12.4 02/04/2019 "    HCT 40.3 02/04/2019    MCV 95 02/04/2019     02/04/2019     Lab Results   Component Value Date    FERRITIN 241 02/04/2019     Lab Results   Component Value Date     06/07/2019    K 4.0 06/07/2019     06/07/2019    CO2 25 06/07/2019     (H) 06/07/2019    BUN 18 06/07/2019    CREATININE 1.0 06/07/2019    CALCIUM 9.9 06/07/2019    PROT 7.5 06/07/2019    ALBUMIN 4.1 06/07/2019    BILITOT 1.4 (H) 06/07/2019    ALKPHOS 85 06/07/2019    AST 16 06/07/2019    ALT 18 06/07/2019     Lab Results   Component Value Date    TSH 1.109 02/04/2019     No results found for: SEDRATE  No results found for: CRP  No results found for: LABA1C, HGBA1C        Assessment and Plan:  Louisa Rowley is a 68 y.o. female with a PMH of anemia, asthma, anxiety, colon CA s/p resection in 1997, DM 2, HTN, HLD referred to motility clinic for second opinion regarding the following problems:    GERD. Belch. Regurgitation.   -Decrease nexium 40 mg daily.    -Will taper to prn at next visit     Chest pain. Resolved.   Followed by cardiology. EKG, labs, Stress test negative. Will be having ECHO soon.     Abdominal pain.  Associated with constipation.   Improved w amitiza   -Start IBgard prn  -Start Iberogast prn     Gas and bloating. w distention.  Avoids lactose and artificial sugars  -Check SIBO   -Start IBgard prn  -Start Iberogast prn     Constipation.  ARM revealing pelvic floor dysfunction and hyposensitivity of the rectum, able to pass the balloon.  MRI defecogram revealing incomplete evacuation, cystocele, pathological descent of vaginal apex, sigmoidocele. SmartPill with delayed colonic transit  Unable to tolerate dulcolax d/t abd cramping  No improvement with miralax once daily  No improvement with Amitiza 16 mcg BID  No improvement with Trulance 3 mg daily x 2 weeks  No improvement with lactulose  No improvement with colace  Some improvement with Linzess 290 mcg daily  -Continue Amitiza 24 mcg BID  -Miralax 1-4  times daily prn   -Proceed with pelvic floor muscle retraining, sensitization and biofeedback as previously scheduled.  -Will consider prucalopride if no improvement with above     History of anxiety. No problems with it any longer. Has not seen psych.     Anemia. Recent labs with normal HGB.  No longer on MV with iron.    Elevated TB  -Check labs  -US  -FU w Dr. Marie    BRBPR.  Few episodes, am amt of blood  Unable to complete colonoscopy due to recent hemoptysis/bronchiectasis, episode now resolved  -PT will schedule w Dr. Frank BROCK    Personal history of colon cancer. descending colon 1997. Resected. History of advanced polyps.  Screening colonoscopy due in 5/2019.   Unable to complete colonoscopy due to recent hemoptysis/bronchiectasis, episode now resolved  -PT will schedule w Dr. Frank BROCK    Colonic blockages x 2. Twisted bowels. surgically repaired. Recent abdominal xray-    Joint pain. Joint swelling. Knees, shoulder, hands. Osteoarthritis.  No raynaud's. No hypermobility. Has seen rheumatologist in the past.     Sciatica. On gabapentin 400 mg BID PRN. Previously followed by neurology. Currently followed by PCP.     Hypokalemia. On potassium 10 mEq daily.       Follow up in about 4 months (around 10/7/2019) for SHEMAR Perera NP, Dr. Jimenez in 7 months .    1. Elevated LFTs    2. Constipation, unspecified constipation type    3. Bloating    4. Abdominal pain, unspecified abdominal location    5. Gastroesophageal reflux disease, esophagitis presence not specified    6. BRBPR (bright red blood per rectum)          Order summary:  Orders Placed This Encounter    US Abdomen Complete    Comprehensive metabolic panel    Bilirubin, direct    Hepatitis B Surface Antibody, Qual/Quant    Hepatitis B surface antigen    Hepatitis B core antibody, total    Hepatitis C antibody    Hepatitis A antibody, IgG    esomeprazole (NEXIUM) 40 MG capsule         Thank you so much for allowing me to participate in the care  of Louisa Jimenez MD

## 2019-06-07 NOTE — PLAN OF CARE
OUTPATIENT PHYSICAL THERAPY EVALUATION        Patient: Louisa [facility] #:  [id number]    Date of treatment: 06/07/2019   Time in: 10:00  Time out: 11:00  Billable time: 55 minutes    POC expiration: 9/7/2019      HISTORY      Louisa is a 68 y.o. female evaluated on 06/07/2019    Physician:  Bety Jimenez MD   Diagnosis:   Encounter Diagnosis   Name Primary?    Disorder of muscle       Treatment ordered: Physical Therapy  Medical History:   Past Medical History:   Diagnosis Date    Acute exacerbation of bronchiectasis     Anemia     Anxiety     Asthma     Chronic constipation     Colon cancer 1997    no chemo/rad    Diabetes     not insulin dependent     High blood pressure     Hypercholesterolemia     Hyperlipidemia     OA (osteoarthritis) of knee     Personal history of colonic polyps       Surgical History:   Past Surgical History:   Procedure Laterality Date    BACK SURGERY      x2    bladder lift      bowel blockage      x2    CARDIAC CATHETERIZATION      COLECTOMY      COLONOSCOPY      HYSTERECTOMY      MANOMETRY, ANORECTAL N/A 2/11/2019    Performed by B[facility] at [facility] (4TH FLR)    TOTAL KNEE ARTHROPLASTY Right     TOTAL SHOULDER ARTHROPLASTY Right       Medications:   Current Outpatient Medications   Medication Sig    albuterol sulfate (VENTOLIN HFA INHL) Inhale into the lungs.    cyclobenzaprine (FLEXERIL) 10 MG tablet Take 10 mg by mouth 3 (three) times daily as needed for Muscle spasms.    esomeprazole (NEXIUM) 40 MG capsule Take 1 capsule (40 mg total) by mouth once daily. Take one pill every morning 45 minutes before breakfast    fluticasone (FLONASE) 50 mcg/actuation nasal spray SHAKE LQ AND U 2 SPRAYS IEN D    gabapentin (NEURONTIN) 400 MG capsule Take 400 mg by mouth 3 (three) times daily.    glimepiride (AMARYL) 2 MG tablet TK 2 TS PO BID    LANCETS MISC 1 lancet by Misc.(Non-Drug; Combo Route) route once daily.    levalbuterol (XOPENEX)  1.25 mg/3 mL nebulizer solution U 3 ML VIA NEB Q 8 H    metoprolol succinate (TOPROL-XL) 25 MG 24 hr tablet TK 1 T PO BID    montelukast (SINGULAIR) 10 mg tablet TK 1 T PO D    multivit-min/iron/folic/lutein (CENTRUM SILVER WOMEN ORAL) Take by mouth.    nebulizer accessories Kit by Misc.(Non-Drug; Combo Route) route.    olmesartan-hydrochlorothiazide (BENICAR HCT) 40-25 mg per tablet TK 1 T PO QD    ondansetron (ZOFRAN) 4 MG tablet TK 1 T PO TID PRF NAUSEA    ONETOUCH ULTRASOFT LANCETS MISC 1 lancet by Misc.(Non-Drug; Combo Route) route 3 (three) times daily.    ONETOUCH VERIO Strp TEST TID AS DIRECTED    oxyCODONE-acetaminophen (PERCOCET) 5-325 mg per tablet Take 1 tablet by mouth every 4 (four) hours as needed for Pain.    potassium chloride (MICRO-K) 10 MEQ CpSR TK ONE C PO  QD    rosuvastatin (CRESTOR) 10 MG tablet TK 1 T PO D    SITagliptin (JANUVIA) 100 MG Tab Take 100 mg by mouth once daily.     No current facility-administered medications for this visit.        Allergies:   Review of patient's allergies indicates:   Allergen Reactions    Aspirin     Ciprocinonide     Morphine         Precautions: universal    OB/GYN History:  childbirth vaginal delivery and perineal laceration    Bladder/Bowel History: trouble emptying bladder completely, urinary incontinence, constipation/straining for movement and straining or pushing to empty bladder      SUBJECTIVE     Date of onset: 2/14/2019  History of current complaint: pt reports a fairly life long history of constipation.  Used to hold it as a child; doesn't like to defecate with anyone in the house.  Urge leakage sometimes.  Denies FI.  She has recently had some blood in her stool and work up is ongoing.  Usually not painful to stool.. Doesn't get the urge to poop very often.  She is taking Amitiza and Miralax.  Sometimes works soon, sometimes takes a while.  Once she feels the urge to poop, she can usually pass stool pretty easily.  She does report  occasional incomplete evacuation.    Patient's goals for therapy: to be able to pass stool more easily  Pain: Patient reports 0/10, with 0 being the lowest and 10 being the highest.    Activities that cause symptoms: strong urge to go, walking to the toilet, full bladder and defecating    Previous treatment included medication    Sexually active? No    Frequency of urination:   Daytime: about 4 times           Nighttime: 1    Difficulty initiating urine stream: No but double voids by pushing  Urine stream: weak  Complete emptying: No, sometimes not  Bladder leakage: Yes  Frequency of incidents: few times per month  Amount leaked (urine): drops and teaspoons    Frequency of bowel movements: once every 1-2 days  Difficulty initiating BM: Yes  Quality/Shape of BM: [place] Stool Chart 1-4  Colon leakage: No  Form of protection: none (pads irritate)    Types of fluid intake: water and rare soda; tea; occasional coffee in the morning  Diet:avoids dairy due to gas  Current exercise:walks in the neighborhood sometimes (just had a knee replacement)    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    OBJECTIVE     ORTHO SCREEN  Posture: WNL  Pelvic alignment: no sign of deviations noted in supine    ABDOMINALS  Scarring: pt with midline abdominal incisional scar, with a transverse abdominal scar slightly above the umbilicus.  Fairly good mobility noted.   Diastasis: none   Abdominal strength: Rectus: 2/5     TA: good but poorly isolated contraction noted  Chaperone: declined  Consent signed: Yes    VAGINAL PELVIC FLOOR EXAM- deferred      RECTAL PELVIC FLOOR EXAM  EXTERNAL ASSESSMENT  Anus: WNL  Skin condition: WNL   Scarring: none  Sensation: WNL   Pain:  none  Voluntary contraction: visible lift  Voluntary relaxation: visible drop  Involuntary contraction: bulge  Bearing down: did not assess due to pt apprehension  Anal Clark: intact  Discharge: none       INTERNAL ASSESSMENT  EAS tone: WNL   Impaction: stool in vault   Pain:  none  Sensation: able to localize pressure appropriately   Muscle Bulk: WFL   Muscle Power: 3/5     Quality of contraction: WNL   Specificity: WNL  Coordination: tends to hold breath during PFM contraction     SEMG EVALUATION: deferred due to time constraints      Functional Limitations Reports    Tool: [facility] PFDI Bowel Survey  Score: 38% Limitation     TREATMENT      Therapeutic Activity to develop coordination for 10 minutes including: proper defecation mechanics (per handout issued)    Education: instructed on general anatomy/physiology of urinary/bowel system; discussed plan of care with patient; instructed in benefits/risks of treatment; instructed in alternative methods of treatment; instructed in risks of refusing treatment; patient agreed to treatment plan.     Also educated in: anatomy/physiology of pelvic floor, posture/body mechanices and colon massage (talked about the squatty potty)    ASSESSMENT      This is a 68 y.o. female referred to outpatient physical therapy and presents with a medical diagnosis of constipation, unspecified. Patient will benefit from skilled physical therapy to maximize her pelvic muscle coordination in the act of defecation to improve ease of evacuation, and to minimize urinary urgency and leakage during ADL's.      Educational/Spiritual/Cultural needs: none  Abuse/Neglect: no signs  Nutritional Status: obese BF   Fall Risk: minimal    Pt's spiritual, cultural and educational needs considered and pt agreeable to plan of care and goals as stated below:     Medical necessity is demonstrated by the following IMPAIRMENTS/PROBLEMS     History  Co-morbidities and personal factors that may impact the plan of care Examination  Body Structures and Functions, activity limitations and participation restrictions that may impact the plan of care Clinical Presentation   Decision Making/ Complexity Score   Co-morbidities:  History of colon cancer and intestinal blockages              Personal  Factors:    Body Regions/Systems/Functions:    poor knowledge of body mechanics and posture, poor trunk stability, increased tension of the pelvic muscles, increased frequency of urination, poor coordination of pelvic floor muscles during ADL's leading to urinary or fecal leakage and dysfunctional defecation           Activity limitations:   Barriers to Learning: none  Environmental Barriers: none noted    Participation Restrictions:   Pt's community outings are limited by bathroom mapping due to urge leakage       Unstable and unpredictable moderate           PLAN    Frequency: once per 2 weeks  Duration: 12 weeks    Long Term Goals: 12 weeks   Pt will report improved ability to perform ADLs (ie. dressing, bathing, functional transfers) with little (drops) to no urinary leakage 7/7 days per week.  Pt will report improved ability to manage bladder spasms appropriately 100% of the time for an improvement in urinary frequency/urgency.   Pt will bear down appropriately 80% of the time for more effective stooling and prevent adverse effects to adjacent structures.   Pt/family will be independent with HEP for continued self-management of symptoms.    Physical therapy will include: therapeutic exercises, therapeutic activity, neuromuscular re-education, manual therapy, modalities PRN and patient/family education      Therapist: Ros Doss, PT, BCB-PMD  6/7/2019

## 2019-06-07 NOTE — PATIENT INSTRUCTIONS
Home Exercise Program: 06/07/2019       Proper push mechanics:  1. Sit comfortably with feet resting on a low stool.  Erect posture.  2. Take a deep breath in, allowing your belly to expand.  3. Then make your belly hard, and push your anal muscles down toward the toilet bowl.    4. Blow out as you push. Release the push when you have breathed out completely.

## 2019-06-07 NOTE — PATIENT INSTRUCTIONS
-Decrease nexium to 40mg daily   -Try IBguard 1 tablet three times per day as needed for abdominal pain, bloating.  You can purchase this over the counter. Look for coupons on line.   -Try iberogast 5 to 20 drops (1 mL), 3 times a day for abdominal pain, nausea, vomiting, bloating.  The duration of taking Iberogast depends on the severity of your symptoms and how long they last.  Try to take it for at least 3 weeks to see if it makes a difference   -Proceed with pelvic floor therapy   -Check labs and ultrasound  -Call Dr. Marie today to arrange colonoscopy to evaluate your rectal bleeding ASAP

## 2019-06-08 LAB
HBV SURFACE AB SER QL IA: NEGATIVE
HBV SURFACE AB SERPL IA-ACNC: 3 MIU/ML

## 2019-06-10 ENCOUNTER — TELEPHONE (OUTPATIENT)
Dept: GASTROENTEROLOGY | Facility: CLINIC | Age: 69
End: 2019-06-10

## 2019-06-10 NOTE — TELEPHONE ENCOUNTER
----- Message from Princess Gonzáles sent at 6/10/2019  9:54 AM CDT -----  Contact: raj delgado/Dr. quinonez office  Test Results    Type of Test: liver function test result  Date of Test: 6/7/19  Communication Preference: [phone] ext [phone]  Additional Information:

## 2019-06-21 ENCOUNTER — TELEPHONE (OUTPATIENT)
Dept: GASTROENTEROLOGY | Facility: CLINIC | Age: 69
End: 2019-06-21

## 2019-06-27 ENCOUNTER — TELEPHONE (OUTPATIENT)
Dept: GASTROENTEROLOGY | Facility: CLINIC | Age: 69
End: 2019-06-27

## 2019-06-27 NOTE — PROGRESS NOTES
[facility] VISIT NEW PT NOTE    REFERRING PROVIDER:  Dr. Bety Jimenez    CHIEF COMPLAINT: elevated bilirubin     HPI: This is a 68 y.o. Black or  female with PMH noted below, presenting for evaluation of elevated bilirubin     Labs done 6/7/19 show Tbili 1.4 (mildly elevated 2/2019 and 6/2019), indirect > direct,normal  ASTand  ALT alk phos WNL, platelets WNL, synthetic function WNL except no recent INR      Lab Results   Component Value Date    ALT 18 06/07/2019    AST 16 06/07/2019    ALKPHOS 85 06/07/2019    BILITOT 1.4 (H) 06/07/2019    ALBUMIN 4.1 06/07/2019     02/04/2019     Abd U/S done today 6/28/19 which showed fatty liver, no biliary ductal dilation     Fibroscan today showed S3 steatosis, no fibrosis    Limited previous serologic w/u negative for HH, Hep B and C    Denies family history of liver disease, Gilbert's disease. Denies alcohol consumption currently or in the past     Immunity to Hep A and B -  Needs twinrix, given paper order to use locally      Denies jaundice, dark urine, abdominal distention, hematemesis, melena, slowed mentation. No abnormal skin rashes. No generalized joint or muscle pain.      Review of patient's allergies indicates:   Allergen Reactions    Aspirin     Ciprocinonide     Morphine        Current Outpatient Medications on File Prior to Visit   Medication Sig Dispense Refill    albuterol sulfate (VENTOLIN HFA INHL) Inhale into the lungs.      cyclobenzaprine (FLEXERIL) 10 MG tablet Take 10 mg by mouth 3 (three) times daily as needed for Muscle spasms.      esomeprazole (NEXIUM) 40 MG capsule Take 1 capsule (40 mg total) by mouth once daily. Take one pill every morning 45 minutes before breakfast 30 capsule 6    fluticasone (FLONASE) 50 mcg/actuation nasal spray SHAKE LQ AND U 2 SPRAYS IEN D  5    gabapentin (NEURONTIN) 400 MG capsule Take 400 mg by mouth 3 (three) times daily.      glimepiride (AMARYL) 2 MG tablet TK 2 TS PO  BID  1    LANCETS MISC 1 lancet by Misc.(Non-Drug; Combo Route) route once daily.      levalbuterol (XOPENEX) 1.25 mg/3 mL nebulizer solution U 3 ML VIA NEB Q 8 H  6    metoprolol succinate (TOPROL-XL) 25 MG 24 hr tablet TK 1 T PO BID  3    montelukast (SINGULAIR) 10 mg tablet TK 1 T PO D  5    multivit-min/iron/folic/lutein (CENTRUM SILVER WOMEN ORAL) Take by mouth.      nebulizer accessories Kit by Misc.(Non-Drug; Combo Route) route.      olmesartan-hydrochlorothiazide (BENICAR HCT) 40-25 mg per tablet TK 1 T PO QD  1    ondansetron (ZOFRAN) 4 MG tablet TK 1 T PO TID PRF NAUSEA  1    ONETOUCH ULTRASOFT LANCETS MISC 1 lancet by Misc.(Non-Drug; Combo Route) route 3 (three) times daily.      ONETOUCH VERIO Strp TEST TID AS DIRECTED  1    oxyCODONE-acetaminophen (PERCOCET) 5-325 mg per tablet Take 1 tablet by mouth every 4 (four) hours as needed for Pain.      potassium chloride (MICRO-K) 10 MEQ CpSR TK ONE C PO  QD  5    rosuvastatin (CRESTOR) 10 MG tablet TK 1 T PO D  2    SITagliptin (JANUVIA) 100 MG Tab Take 100 mg by mouth once daily.       No current facility-administered medications on file prior to visit.        PMHX:  has a past medical history of Anemia, Anxiety, Asthma, Bronchiectasis, Chronic constipation, Colon cancer (1997), Fatty liver (6/28/2019), High blood pressure, Hyperlipidemia, OA (osteoarthritis) of knee, Obesity (BMI 30-39.9) (6/28/2019), Personal history of colonic polyps, and Type 2 diabetes mellitus.    PSHX:  has a past surgical history that includes Cardiac catheterization; Hysterectomy; Total knee arthroplasty (Right); bladder lift; Colectomy; Colonoscopy; Back surgery; Total shoulder arthroplasty (Right); bowel blockage; and Anorectal manometry (N/A, 2/11/2019).    FAMILY HISTORY: Negative for liver disease, reviewed in [facility]    SOCIAL HISTORY:   Social History     Tobacco Use   Smoking Status Former Smoker   Smokeless Tobacco Never Used   Tobacco Comment    quit in 2000  "      Social History     Substance and Sexual Activity   Alcohol Use No    Frequency: Never       Social History     Substance and Sexual Activity   Drug Use No       ROS:   GENERAL: Denies fever, chills, weight loss/gain, fatigue  HEENT: Denies headaches, dizziness, vision/hearing changes  CARDIOVASCULAR: Denies chest pain, palpitations, or edema  RESPIRATORY: Denies dyspnea, cough  GI:+ intermittent abdominal pain, denies rectal bleeding, nausea, vomiting.   : Denies dysuria, hematuria   SKIN: Denies rash, itching   NEURO: Denies confusion, memory loss, or mood changes  PSYCH: Denies depression or anxiety  HEME/LYMPH: Denies easy bruising or bleeding    PHYSICAL EXAM:   Friendly Black or  female, in no acute distress; alert and oriented to person, place and time  VITALS: BP (!) 151/82 (BP Location: Left arm, Patient Position: Sitting, BP Method: Medium (Automatic))   Pulse 76   Temp 97.5 °F (36.4 °C) (Oral)   Resp 18   Ht 5' 7" (1.702 m)   Wt 96.4 kg (212 lb 8.4 oz)   SpO2 98%   BMI 33.29 kg/m²   HENT: Normocephalic, without obvious abnormality. Oral mucosa pink and moist. Dentition good.  EYES: Sclerae anicteric. No conjunctival pallor.   NECK: Supple. No masses or cervical adenopathy.  CARDIOVASCULAR: Regular rate and rhythm. No murmurs.  RESPIRATORY: Normal respiratory effort. BBS CTA. No wheezes or crackles.  GI: Soft, non-tender, non-distended. No hepatosplenomegaly. No masses palpable. No ascites.  EXTREMITIES:  No clubbing, cyanosis or edema.  SKIN: Warm and dry. No jaundice. No rashes noted to exposed skin. No telangectasias noted. No palmar erythema.  NEURO:  Normal gait. No asterixis.  PSYCH:  Memory intact. Thought and speech pattern appropriate. Behavior normal. No depression or anxiety noted.    DIAGNOSTIC STUDIES:    ABD. U/S-    Done 6/28/19  FINDINGS:  Pancreas: The visualized portions of pancreas appear normal.    Aorta: No aneurysm.    Liver: 16.7 cm, normal in size. " Diffusely increased parenchymal echogenicity with an elevated HRI of 1.6, consistent with greater than 5% steatosis.  No focal lesions.    Gallbladder: No calculi, wall thickening, or pericholecystic fluid.  Negative sonographic Flores's sign.    Biliary system: 3 mm common bile duct.  No intrahepatic ductal dilatation.    Inferior vena cava: Normal in appearance.    Right kidney: 10.4 cm. No hydronephrosis.  Nonspecific hyperechoic focus within the lower pole measuring 0.8 cm, not demonstrating twinkle artifact or posterior shadowing to suggest a renal stone.  This may represent a benign angiomyolipoma.    Left kidney: 9.6 cm. No hydronephrosis.    Spleen: 7.8 x 4 cm.  Normal in size with homogeneous echotexture.    Miscellaneous: No ascites.      Impression       1. Hepatic steatosis.  2. Nonspecific 8 mm hyperechoic focus within the right kidney, which may represent a benign angiomyolipoma.       FIBROSCAN - done today 19  Fibroscan readin.8 KPa  Fibrosis:F 0-1   CAP readin dB/m  Steatosis: :S3     ASSESSMENT:  68 y.o. Black or  female with:  1. Elevated bilirubin   -- serological workup to be done to assess for possible causes  -- T bili 1.4  -- Albumin WNL  -- indirect > direct      US has no concerns for biliary dilation. Will assess for anemia or hemolysis. Rule out hemolysis with serologic w/u, fibroscan for fibrosis staging.  If normal, most suggestive of Gilbert's and patient can be reassured.      PLAN:   1. Labs today   Orders Placed This Encounter   Procedures    US Elastography Liver    Alpha-1-antitrypsin    Bilirubin, direct    Ceruloplasmin    Comprehensive metabolic panel    Haptoglobin    Lactate dehydrogenase    Misc Sendout Test, Blood [place] [facility] Test ID: [id number]    Protime-INR    Reticulocytes    CBC auto differential     2. Fibroscan today done   3. F/u pending results of above    Thank you for allowing me to participate in the care of Louisa GRAF  Amrik Mack NP-C    CC'ed note to:   Bety Jimenez MD Tiffany A Tunnell, NP

## 2019-06-27 NOTE — TELEPHONE ENCOUNTER
----- Message from Uma Wolf sent at 6/27/2019 10:19 AM CDT -----  Contact: Self- [phone]  Barbara- pt called to determine if US of abdomen can be moved to 6/28 instead 7/1- already has another appt on 6/28- please contact pt at [phone]

## 2019-06-28 ENCOUNTER — OFFICE VISIT (OUTPATIENT)
Dept: HEPATOLOGY | Facility: CLINIC | Age: 69
End: 2019-06-28
Payer: MEDICARE

## 2019-06-28 ENCOUNTER — PROCEDURE VISIT (OUTPATIENT)
Dept: HEPATOLOGY | Facility: CLINIC | Age: 69
End: 2019-06-28
Attending: NURSE PRACTITIONER
Payer: MEDICARE

## 2019-06-28 ENCOUNTER — PATIENT MESSAGE (OUTPATIENT)
Dept: HEPATOLOGY | Facility: CLINIC | Age: 69
End: 2019-06-28

## 2019-06-28 ENCOUNTER — HOSPITAL ENCOUNTER (OUTPATIENT)
Dept: RADIOLOGY | Facility: HOSPITAL | Age: 69
Discharge: HOME | End: 2019-06-28
Attending: INTERNAL MEDICINE
Payer: MEDICARE

## 2019-06-28 VITALS
OXYGEN SATURATION: 98 % | WEIGHT: 212.5 LBS | SYSTOLIC BLOOD PRESSURE: 151 MMHG | DIASTOLIC BLOOD PRESSURE: 82 MMHG | RESPIRATION RATE: 18 BRPM | HEIGHT: 67 IN | HEART RATE: 76 BPM | BODY MASS INDEX: 33.35 KG/M2 | TEMPERATURE: 98 F

## 2019-06-28 DIAGNOSIS — R79.89 ELEVATED LFTS: ICD-10-CM

## 2019-06-28 DIAGNOSIS — E66.9 OBESITY (BMI 30-39.9): ICD-10-CM

## 2019-06-28 DIAGNOSIS — R17 ELEVATED BILIRUBIN: ICD-10-CM

## 2019-06-28 DIAGNOSIS — E11.65 TYPE 2 DIABETES MELLITUS WITH HYPERGLYCEMIA, WITHOUT LONG-TERM CURRENT USE OF INSULIN: ICD-10-CM

## 2019-06-28 DIAGNOSIS — R17 ELEVATED BILIRUBIN: Primary | ICD-10-CM

## 2019-06-28 DIAGNOSIS — K76.0 FATTY LIVER: ICD-10-CM

## 2019-06-28 PROCEDURE — 99215 OFFICE O/P EST HI 40 MIN: CPT | Mod: PBBFAC,25 | Performed by: NURSE PRACTITIONER

## 2019-06-28 PROCEDURE — 99999 PR PBB SHADOW E&M-EST. PATIENT-LVL V: ICD-10-PCS | Mod: PBBFAC,,, | Performed by: NURSE PRACTITIONER

## 2019-06-28 PROCEDURE — 76700 US ABDOMEN COMPLETE: ICD-10-PCS | Mod: 26,,, | Performed by: RADIOLOGY

## 2019-06-28 PROCEDURE — 99999 PR PBB SHADOW E&M-EST. PATIENT-LVL V: CPT | Mod: PBBFAC,,, | Performed by: NURSE PRACTITIONER

## 2019-06-28 PROCEDURE — 99214 OFFICE O/P EST MOD 30 MIN: CPT | Mod: S$PBB,,, | Performed by: NURSE PRACTITIONER

## 2019-06-28 PROCEDURE — 91200 LIVER ELASTOGRAPHY: CPT | Mod: PBBFAC | Performed by: NURSE PRACTITIONER

## 2019-06-28 PROCEDURE — 91200 LIVER ELASTOGRAPHY: CPT | Mod: 26,S$PBB,, | Performed by: NURSE PRACTITIONER

## 2019-06-28 PROCEDURE — 76700 US EXAM ABDOM COMPLETE: CPT | Mod: TC

## 2019-06-28 PROCEDURE — 99214 PR OFFICE/OUTPT VISIT, EST, LEVL IV, 30-39 MIN: ICD-10-PCS | Mod: S$PBB,,, | Performed by: NURSE PRACTITIONER

## 2019-06-28 PROCEDURE — 76700 US EXAM ABDOM COMPLETE: CPT | Mod: 26,,, | Performed by: RADIOLOGY

## 2019-06-28 PROCEDURE — 91200 PR LIVER ELASTOGRAPHY W/OUT IMAG W/INTERP & REPORT: ICD-10-PCS | Mod: 26,S$PBB,, | Performed by: NURSE PRACTITIONER

## 2019-06-28 RX ORDER — PREDNISONE 10 MG/1
TABLET ORAL DAILY
Refills: 0 | COMMUNITY
Start: 2019-06-25

## 2019-06-28 RX ORDER — AMOXICILLIN AND CLAVULANATE POTASSIUM 875; 125 MG/1; MG/1
TABLET, FILM COATED ORAL
Refills: 0 | COMMUNITY
Start: 2019-06-25

## 2019-06-28 NOTE — PATIENT INSTRUCTIONS
1. Fibroscan today to look for fat or scar tissue in the liver  2. Ultrasound of the liver - results pending   3. Will check immunity markers for Hepatitis A and B and arrange for vaccination if needed  4. Labs today to rule out other causes of elevated bilirubin. If fibroscan normal and labs normal, likely related to Gilbert's disease (see below)  5.  Follow up pending results of above    Your immunity markers show that you do NOT have immunity against Hepatitis A or B, so I recommend that you receive the combination Hepatitis A and B vaccine called TwinRix. This will protect your liver from these viruses, which can make your liver very sick.     Hep A can be transmitted through food and water and can cause significant liver injury. There is a Hepatitis A outbreak in [place] currently. Hep B vaccine is transmitted through blood or bodily fluids (there are no symptoms typically) and can develop longstanding (chronic) Hep B and there is no cure, so many people have it for life. Therefore, the vaccines provide immunity against these viruses that can cause harm to the liver.     The vaccine series is 3 vaccines: one now, one at 4 weeks and one 6 months after the 1st one.          Gilbert's syndrome can cause mild jaundice from time to time. It is usually harmless and does not require treatment. It is due to a reduced amount of a chemical in the liver, which processes a breakdown product of blood cells, called bilirubin.    The take home message is - Gilbert's syndrome is really a mild abnormality of how the liver processes a chemical (enzyme) called bilirubin. It is not really a 'disease' and does not normally cause problems.    What is Gilbert's syndrome?  Gilbert's syndrome is a condition where the liver does not process bilirubin very well. It is sometimes called Gilbert's disease although it does not cause 'disease' as such. The liver itself is normal and the condition is harmless. The condition is named after  the doctor who first described it in 1901.    What is bilirubin and what happens in Gilbert's syndrome?  Bilirubin is constantly being made. It is a breakdown product of haemoglobin. Haemoglobin is a chemical that is in red blood cells and carries oxygen to the tissues. Many red blood cells break down each day releasing haemoglobin, and bilirubin is one of the waste products. Bilirubin is carried in the bloodstream to the liver where it is taken in by liver cells. The liver cells process the bilirubin which is then passed out from the liver into the gut with the bile.    A chemical (enzyme) in liver cells, called uridine diphosphate glucuronosyltransferase (UGT), helps the liver cells to process the bilirubin. People with Gilbert's syndrome have less of this enzyme and so a backlog of bilirubin can build up in the bloodstream. A high level of bilirubin causes a yellowing of skin and the whites of the eyes (jaundice). In people with Gilbert's syndrome, the blood level of bilirubin can go up and down. Often the level is normal. At other times it is higher than normal but not very high.    Who gets Gilbert's syndrome?  Gilbert's syndrome is a very common hereditary condition. About 1 out of every 20 people have this syndrome - but 1 in 3 people who have it will not know that they have it. It is more common in men than in women. It is often first diagnosed in the late teens or early twenties.    Gilbert's syndrome symptoms  Usually none    The level of bilirubin in the blood goes up and down. However, this does not usually cause any problems as the level does not go very high.    Jaundice  If the level of bilirubin goes above a certain level you can develop yellowing of skin and the whites of your eyes. This is because bilirubin is an orangy-yellow colour. This condition is called jaundice. Some people with Gilbert's syndrome become mildly jaundiced from time to time. This may seem alarming, but is of little concern if  the cause is Gilbert's syndrome.     The jaundice tends to occur most commonly if you are ill with another problem such as:    An infection.  Starvation.  Repeatedly being sick (vomiting).  Following surgery.  During times of exertion or stress.  If you have Gilbert's syndrome there are some medicines which you should avoid if alternatives are available. These include:    Atazanavir and indinavir (used for the treatment of HIV infection).  Gemfibrozil (a cholesterol-reducing agent).  Statins (a group of cholesterol-lowering medicines) when taken with gemfibrozil.  Irinotecan (used for the treatment of advanced bowel cancer).  Nilotinib (used for the treatment of some blood cancers).    Other symptoms are uncommon    The jaundice itself does not usually cause any problems. However, some people with Gilbert's syndrome report other symptoms - most commonly:    Tiredness.  Mild weakness.  Mild tummy (abdominal) pains.  A mild feeling of sickness (nausea).    It is not clear whether these symptoms are actually related to Gilbert's syndrome. There does not seem to be any relationship between these symptoms and the level of bilirubin in the blood. That is, these symptoms may develop whether the level of bilirubin is high or not and may be due to the problem that caused the liver to have to work a little bit harder.    Do I need any tests?  Yellowing of skin and the whites of the eyes (jaundice - a high level of bilirubin) can be caused by many different diseases of the liver and blood. Therefore, if you develop jaundice you are likely to need tests to clarify the cause and to rule out serious disease.    A blood test can usually confirm the diagnosis of Gilbert's syndrome. It shows a mildly raised level of bilirubin; however, all the other liver tests will be normal. Very rarely, other tests such as a liver biopsy may be done to rule out liver diseases if the diagnosis is in doubt.     Gilbert's syndrome is also commonly  diagnosed by chance when routine blood tests that are done for other problems show a raised level of bilirubin.    Treatment for Gilbert's syndrome  No treatment is needed. People with Gilbert's syndrome lead normal healthy lives. Life expectancy is not affected and life insurance is not affected. There is even some evidence that people with Gilbert's syndrome live longer and are heathier than people who don't have it. Mild yellowing of skin and the whites of the eyes (jaundice) may come back from time to time for short periods but usually causes no health problems.    The take home message is - Gilbert's syndrome is really a mild abnormality of how the liver processes a chemical (enzyme) called bilirubin. It is not really a 'disease' and does not normally cause problems.

## 2019-06-28 NOTE — PROGRESS NOTES
I have personally performed a face to face diagnostic evaluation on this patient. I have reviewed and agree with today's findings and the care plan outlined by Mayelin Mack NP with following comments:     Louisa Rowley is a pleasant 68 y.o. female who was referred for unconjugated hyperbilirubinemia. and sonographic finding of hepatic steatosis.     The patient's risk factors for NAFLD include:    · Obesity/overweight                     Yes Body mass index is 33.29 kg/m².  · Dyslipidemia                                yes  · Insulin resistance/Diabetes         no  · Family history of diabetes           no    Agree with obtaining VCTE for hepatic fibrosis/steatosis assessment. Unconjugated hyperbilirubinemia is mild and likely secondary to Gilbert's syndrome, patient has been reassured.    We have counseled the patient regarding the life-style modifications: weight loss, low calorie/low fat diet and exercise.

## 2019-06-28 NOTE — LETTER
June 28, 2019      Bety Jimenez MD  [address] [address]           [facility]atology  [address]  [facility] [address]  Phone: [phone]  Fax: [phone]          Patient: Louisa Rowley   MR Number: [id number]   YOB: 1950   Date of Visit: 6/28/2019       Dear Dr. Bety Jimenez:    Thank you for referring Louisa Rowley to me for evaluation. Attached you will find relevant portions of my assessment and plan of care.    If you have questions, please do not hesitate to call me. I look forward to following Louisa Rowley along with you.    Sincerely,    Mayelin Mack, JEFRY    Enclosure  CC:  No Recipients    If you would like to receive this communication electronically, please contact [email or web address] or [phone] to request more information on [employer] Link access.    For providers and/or their staff who would like to refer a patient to [facility], please contact us through our one-stop-shop provider referral line, [facility], at [phone].    If you feel you have received this communication in error or would no longer like to receive these types of communications, please e-mail [email or web address]

## 2019-06-28 NOTE — PROCEDURES
Procedures     Fibroscan Procedure     Name: Louisa Rowley  Date of Procedure : 2019   :: Mayelin Mack NP  Diagnosis: elevated bilirubin    Probe: XL    Fibroscan readin.8 KPa    Fibrosis:F 0-1     CAP readin dB/m    Steatosis: :S3

## 2019-06-29 ENCOUNTER — TELEPHONE (OUTPATIENT)
Dept: GASTROENTEROLOGY | Facility: HOSPITAL | Age: 69
End: 2019-06-29

## 2019-06-29 NOTE — TELEPHONE ENCOUNTER
Called to complain of constipation, left lower quadrant, abdominal distension.    She was asking if she can take a fleet enema for relief of her symptoms. I explained that it is hard to say what the cause of her worsening symptoms, and given her symptoms of prior bowel obstructions and surgeries she should seek ER evaluation to ensure symptoms are not due to an emergency.    Discussed with daughter over the phone as well. Questions answered.

## 2019-07-01 ENCOUNTER — TELEPHONE (OUTPATIENT)
Dept: GASTROENTEROLOGY | Facility: CLINIC | Age: 69
End: 2019-07-01

## 2019-07-01 NOTE — TELEPHONE ENCOUNTER
----- Message from Bety Jimenez MD sent at 6/28/2019  6:06 PM CDT -----  pls let pt knwo that US shows fatty liver - she is seign hep[atology for this   Small kidney lesion, likley benign-needs to fu w PCP to monitor this- fax us to PCP

## 2019-07-03 ENCOUNTER — TELEPHONE (OUTPATIENT)
Dept: GASTROENTEROLOGY | Facility: CLINIC | Age: 69
End: 2019-07-03

## 2019-07-03 NOTE — TELEPHONE ENCOUNTER
----- Message from Uma Wolf sent at 7/3/2019 12:29 PM CDT -----  Contact: Self- [phone]  Barbara- pt returning missed call regarding US results- please contact pt at [phone]

## 2019-07-08 ENCOUNTER — TELEPHONE (OUTPATIENT)
Dept: GASTROENTEROLOGY | Facility: CLINIC | Age: 69
End: 2019-07-08

## 2019-07-08 NOTE — TELEPHONE ENCOUNTER
----- Message from Princess Gonzáles sent at 7/8/2019 10:19 AM CDT -----  Contact: pt   Patient Returning Call from [facility]    Who Left Message for Patient: Alfredocesilianivia  Communication Preference: [phone]  Additional Information: calling you back for results

## 2019-07-08 NOTE — TELEPHONE ENCOUNTER
----- Message from Andreea Cardona sent at 7/8/2019 12:35 PM CDT -----  Contact: pt#[phone]  Needs Advice    Reason for call:Pt states that her pcp did not received results and she will like it faxed         Communication Preference:fax# [phone] Dr Mark Liz     Additional Information:

## 2019-07-09 NOTE — TELEPHONE ENCOUNTER
Pt states she is still experiencing some constipation. Pt states the pain is in left thigh and is radiating to abdomen. Pt stated she was give tramodol and a couple other meds (that she could not name).

## 2019-08-14 ENCOUNTER — TELEPHONE (OUTPATIENT)
Dept: GASTROENTEROLOGY | Facility: CLINIC | Age: 69
End: 2019-08-14

## 2019-08-14 NOTE — TELEPHONE ENCOUNTER
Pt wanted to see if it was our office who stated something about injections however I do not see anything in her chart about it if so which I told pt.

## 2019-08-14 NOTE — TELEPHONE ENCOUNTER
----- Message from Jennie Fischer sent at 8/14/2019 11:05 AM CDT -----  Contact: Patient   Needs Advice    Reason for call: Patient is asking to speak with the nurse as she has questions        Communication Preference: [phone]     Additional Information: please contact the patient, no additional information given

## 2019-09-19 ENCOUNTER — CLINICAL SUPPORT (OUTPATIENT)
Dept: REHABILITATION | Facility: HOSPITAL | Age: 69
End: 2019-09-19
Attending: INTERNAL MEDICINE
Payer: MEDICARE

## 2019-09-19 DIAGNOSIS — M62.9 DISORDER OF MUSCLE: ICD-10-CM

## 2019-09-19 PROCEDURE — 97112 NEUROMUSCULAR REEDUCATION: CPT | Mod: PO

## 2019-09-19 NOTE — PATIENT INSTRUCTIONS
"Poop Sequence: 9/19/2019    1. Be sure that you are sitting with erect posture, with feet on floor or stool.      2. Take a deep breath in and allow your belly to expand.    3. Make your belly "hard".    4. Keeping your belly hard, push your anal muscles down and exhale through pursed lips.      5. Take a few cleansing breaths in between.    "

## 2019-09-19 NOTE — PROGRESS NOTES
OUTPATIENT PHYSICAL THERAPY DAILY NOTE       Patient: Louisa GRAF [facility] #:  [id number]    Diagnosis:   Encounter Diagnosis   Name Primary?    Disorder of muscle       Date of treatment: 09/19/2019     Time in: 9:30  Time out: 10:15  Billable time: 45 minutes  Visit #: 2  Auth: 20 exp 12/31  POC expiration: 9/7/2019    SUBJECTIVE   Pt reports: reports that she is having pulmonary problems that keep her from attending therapy sessions.  She has missed 3 sessions since her initial evaluation.  She still depends on Exlax chews to have a BM.  Linzess and Amitiza don't work.  She is trying a vegetable and fruit diet but has a hard time staying on it.  She continues to voice apprehension about working on bearing down- is afraid that she will have a bowel movement on the treatment table.    Pain: 0/10 on VAS scale  Pain location: NA    OBJECTIVE     Neuromuscular Re-education to develop coordination for 45 minutes including:Kegels with assist of SEMG and bearing down with abdominal release.  We worked in supine and SL DKTC with external lead wires.  She demonstrated normal baseline resting, and good WR rise and holding ability in Kegels.  We then turned our attention to diaphragmatic breathing, and the sequence for bearing down.  She was able to keep her belly distended and exhale thru pursed lips.  We then looked at bearing down in sidelying.  She was able to bear down without excessive SEMG activity.  With internal rectal palpation of the MA, she was noted to lengthen and open the anal outlet rather well.  She was issued a handout with the proper sequence for bearing down to practice at home.  We discussed trying to bulk her stools for improvement in rectal pressure sensation when stool is in the vault, to allow her more complete evacuation.      Education: Discussed progression of plan of care with patient; educated pt in activity modification; reviewed HEP with pt. Pt demonstrated and verbalized understanding  of all instruction and was provided with a handout of HEP (see Patient Instructions).      ASSESSMENT      Pt tolerated entire treatment well with no visible adverse effects. She pushed pretty well- her stools are of such small caliber that she rally can't feel much of anything with her already decreased rectal sensation.    Will the patient continue to benefit from skilled PT intervention? Yes  Medical necessity demonstrated by: Skilled PT supervision required for HEP and treatment progression  Progress towards goals:  fair  New/Revised goals: none      PLAN     Continue with established Plan of Care, working toward established PT goals.

## 2019-10-10 ENCOUNTER — TELEPHONE (OUTPATIENT)
Dept: GASTROENTEROLOGY | Facility: CLINIC | Age: 69
End: 2019-10-10

## 2019-10-10 NOTE — TELEPHONE ENCOUNTER
----- Message from Andreea Cardona sent at 10/10/2019  8:44 AM CDT -----  Contact: pt#[phone]  Pt is calling to reschedule appt. Please call

## 2019-10-25 ENCOUNTER — TELEPHONE (OUTPATIENT)
Dept: GASTROENTEROLOGY | Facility: CLINIC | Age: 69
End: 2019-10-25

## 2019-10-25 NOTE — TELEPHONE ENCOUNTER
----- Message from Sofia Lopez sent at 10/25/2019  7:16 AM CDT -----  Contact: Pt  PT called to speak to the nurse to cancel/reschedule her 8am appt due to the weather and would like a call back this morning asap at [phone]

## 2019-10-29 ENCOUNTER — OFFICE VISIT (OUTPATIENT)
Dept: GASTROENTEROLOGY | Facility: CLINIC | Age: 69
End: 2019-10-29
Payer: MEDICARE

## 2019-10-29 VITALS
DIASTOLIC BLOOD PRESSURE: 84 MMHG | HEIGHT: 67 IN | HEART RATE: 78 BPM | BODY MASS INDEX: 33.92 KG/M2 | SYSTOLIC BLOOD PRESSURE: 125 MMHG | WEIGHT: 216.13 LBS

## 2019-10-29 DIAGNOSIS — R10.9 ABDOMINAL PAIN, UNSPECIFIED ABDOMINAL LOCATION: ICD-10-CM

## 2019-10-29 DIAGNOSIS — R14.0 BLOATING: ICD-10-CM

## 2019-10-29 DIAGNOSIS — K59.09 CHRONIC CONSTIPATION: Primary | ICD-10-CM

## 2019-10-29 DIAGNOSIS — K21.9 GASTROESOPHAGEAL REFLUX DISEASE, ESOPHAGITIS PRESENCE NOT SPECIFIED: ICD-10-CM

## 2019-10-29 PROCEDURE — 99215 OFFICE O/P EST HI 40 MIN: CPT | Mod: S$PBB,,, | Performed by: NURSE PRACTITIONER

## 2019-10-29 PROCEDURE — 99999 PR PBB SHADOW E&M-EST. PATIENT-LVL V: ICD-10-PCS | Mod: PBBFAC,,, | Performed by: NURSE PRACTITIONER

## 2019-10-29 PROCEDURE — 99999 PR PBB SHADOW E&M-EST. PATIENT-LVL V: CPT | Mod: PBBFAC,,, | Performed by: NURSE PRACTITIONER

## 2019-10-29 PROCEDURE — 99215 PR OFFICE/OUTPT VISIT, EST, LEVL V, 40-54 MIN: ICD-10-PCS | Mod: S$PBB,,, | Performed by: NURSE PRACTITIONER

## 2019-10-29 PROCEDURE — 99215 OFFICE O/P EST HI 40 MIN: CPT | Mod: PBBFAC | Performed by: NURSE PRACTITIONER

## 2019-10-29 RX ORDER — METFORMIN HYDROCHLORIDE 500 MG/1
500 TABLET ORAL
COMMUNITY

## 2019-10-29 NOTE — PATIENT INSTRUCTIONS
Decrease nexium 40 mg to every other day for one week, then take it as needed for reflux.    Continue pelvic floor physical therapy.     Complete SIBO breath test as previously advised for further evaluation of abdominal discomfort, gas and bloating.     Take over the counter IBgard as needed for lower abdominal pain. If you do not find it in stores, you can order in at amazon.com    Try over the counter iberogast 5 to 20 drops (1 mL), 3 times a day for abdominal pain, nausea, vomiting, bloating.  The duration of taking Iberogast depends on the severity of your symptoms and how long they last.  Try to take it for at least 3 weeks to see if it makes a difference. You can order it online at [employer].    We have sent a prescription for a medication for constipation that just became available to [facility] pharmacy.   - Start prucalopride 2mg daily (motegrity) for constipation.    -The most common side effects of prucalopride/motegrity include: headache, stomach area (abdominal) pain or bloating, nausea, diarrhea, dizziness, vomiting, gas and fatigue. If reported, diarrhea or headache typically occurred within the first week of treatment and resolved within a few days of continued use.   This medication may also have impact on your mood.  Stop taking Motegrity right away and tell your healthcare provider immediately if your depression gets worse, you feel sad, hopeless or begin to have thoughts of suicide, thoughts of hurting yourself or have tried to hurt yourself.   -If you need help with prescription coverage call  1-894.265.3799 or look for information at [email or web address]      Until the prucalopride is approved, continue  amitiza twice daily and miralax four times daily. Take 1 cap full (17 g) of over the counter miralax  twice daily. If no improvement after another 5 days, increase miralax to 1 cap full three times daily. If no improvement after another 5 days, increase miralax to 1 cap full four times daily.        Contact your primary care provider to ensure the spot seen on your kidney on ultrasound has been addressed.

## 2019-10-29 NOTE — PROGRESS NOTES
[facility] Consultation Note    Reason for Consult:    Chief Complaint   Patient presents with    Abdominal Pain    Gas     bloated    Constipation         PCP:   Mark Liz       Referring MD:  Dr. Bimal Marie       HPI:  Louisa Rowley is a 69 y.o. female with a PMH of anemia, asthma, anxiety, colon CA s/p resection in 1997, DM 2, HTN, HLD referred to motility clinic for second opinion regarding the following problems:    Had hemoptysis and was unable complete colonoscopy. Now better . She reports she has had colonosocpy locally     GERD. Belch. Regurgitation. Well controlled with nexium 40 mg daily.     Chest pain. No longer having.       Abdominal pain. Improved.  infrequent.   Associated w BMs  Has not tried IBguard or iberogast yet     Gas and bloating. Still w bothersome gas and bloating   Uses lactose occasionally   Occasionally uses artificial sugars -tries to avoid  Has not tried IBguard or iberogast yet     Constipation.  Reports bothersome constipation:  Still w constipation.   Cascade 6  Frequency:1 day  No improvement w amitiza 24mg BID and miralax daily    Linzess 290 mcg lost efficacy  Has  started PT for pelvic floor dysfunction   Some improvement with exlax prn    BRBPR. Few episodes of BRBPR, small amount on tissue paper during straining. No longer occurring. States she has had colonoscopy in 9/2019 w Dr. Marie w four polyps removed w recommended rpt in 3 yrs    History of anxiety. Doing well    Anemia. No longer taking iron     Personal history of colon cancer. descending colon 1997. Resected.     Colonic blockages x 2. Twisted bowels. surgically repaired.     Sciatica. On gabapentin PRN - not taking. Previously followed by neurology. Currently followed by PCP.     Hypokalemia. On potassium 10 mEq daily.     abd us with spot on kidney. Cannot recall speaking to PCP about it, but may have.     Denies dysphagia,  nausea, vomiting, early satiety,  diarrhea, melena, weight loss.       Total visit time was 40 minutes, more than 50% of which was spent in face-to-face counseling with patient regarding symptoms, diagnostic results, prognosis, risks and benefits of treatment options, instructions for management, importance of compliance with chosen treatment options, risk factor reduction, stress reduction, coping strategies.      Previous Studies:   Fibro scan 6/28/19:S3 steatosis, F0-1 fibrosis  abd us 6/28/19: fatty liver. 8 mm hyperechoic kidney focus.   Smart Pill 5/8/2019: Normal gastric emptying.  Normal small bowel.  Delayed colonic transit.  KUB 5/1/2019: negative    Anorectal Manometry 2/11/2019: Normal basal anal sphincter pressure. Adequate squeeze.  Dyssyngergic defecation Type I.  Hyposensitivity in the rectum.  Patient was able to evacuate 50cc balloon.  Possible intussusception noted.  MRDefecography 2/4/2019: There is incomplete evacuation of rectal gel during the examination. Rectal Intussusception: Not identified. 1.5cm AP  Rectocele. Anatomic findings: Status post hysterectomy.  Asymmetric thinning of the left levator musculature.  Incomplete evacuation. 2.    Anterior compartment findings: Grade 2 cystocele with evacuation. 3.    Middle compartment findings: Grade 2 pathologic descent of the vaginal apex with evacuation.   4.    Posterior compartment findings: Low lying anorectal junction at rest with grade 2 widening of the levator hiatus with evacuation.  Additionally, grade 2 peritoneocele and grade 1 sigmoidocele with evacuation.   Colon 5/10/18: GPTC. 6 mm AC polyp(fecal material). 9 mm AC polyp (TA), 3mm AC polyp (TA). 3 mm AC polyp(TA). Small IH. Rpt in 1 year.  GES 4/10/2017: nl. T ½ 75 min  *KUB 12/2016: unremarkable  *Colon 5/26/15: s/p surgery in [place]. 7 mm TC polyp (?). 4 mm SC polyp(?).   *Colon 10/28/14: 1.5 cm AC polyp(?). 5 mm TC polyp (?). 2.5 cm TC polyp (TVA). IH.     *per referring provider report    Labs:   6/28/19: Direct  bili elevated  0.4  2/4/19: TTG/IgA -  6/1/15: hgb nl 13.5    Relevant Surgeries:  Colonic resection 2/2 colon CA (1997)      ROS:  ROS   Constitutional: No fevers, no chills, no night sweats, no weight loss  ENT: no congestion, + rhinorrhea, + chronic sinus problems  CV: + chest pain, no palpitations  Pulm: + cough, no shortness of breath  Ophtho: + blurry vision, no eye redness  GI: see HPI  Derm: No rash  Heme: No lymphadenopathy, no bruising  MSK: + joint pain, + joint swelling, no Raynauds  : No dysuria, + frequent urination, no blood in urine  Endo: No hot or cold intolerance  Neuro: No dizziness, no syncope, no seizure  Psych: No anxiety, no depression        Medical History:   Past Medical History:   Diagnosis Date    Anemia     Anxiety     Asthma     Bronchiectasis     Chronic constipation     Colon cancer 1997    no chemo/rad    Fatty liver 6/28/2019    S3 on fibroscan, no fibrosis 6/2019    High blood pressure     Hyperlipidemia     OA (osteoarthritis) of knee     Obesity (BMI 30-39.9) 6/28/2019    Personal history of colonic polyps     Type 2 diabetes mellitus     not insulin dependent         Surgical History:   Past Surgical History:   Procedure Laterality Date    ANORECTAL MANOMETRY N/A 2/11/2019    Procedure: MANOMETRY, ANORECTAL;  Surgeon: Bety Jimenez MD;  Location: [address]);  Service: Endoscopy;  Laterality: N/A;  Traveler; instructed to check-in for 12    BACK SURGERY      x2    bladder lift      bowel blockage      x2    CARDIAC CATHETERIZATION      COLECTOMY      COLONOSCOPY      HYSTERECTOMY      TOTAL KNEE ARTHROPLASTY Right     TOTAL SHOULDER ARTHROPLASTY Right         Family History:   Family History   Problem Relation Age of Onset    Prostate cancer Father     Arthritis Mother     Cancer Maternal Aunt     Celiac disease Neg Hx     Cirrhosis Neg Hx     Colon cancer Neg Hx     Colon polyps Neg Hx     Crohn's disease Neg Hx     Cystic fibrosis  Neg Hx     Esophageal cancer Neg Hx     Hemochromatosis Neg Hx     Inflammatory bowel disease Neg Hx     Irritable bowel syndrome Neg Hx     Liver cancer Neg Hx     Liver disease Neg Hx     Rectal cancer Neg Hx     Stomach cancer Neg Hx     Ulcerative colitis Neg Hx     Westley's disease Neg Hx     Lymphoma Neg Hx     Tuberculosis Neg Hx     Scleroderma Neg Hx     Rheum arthritis Neg Hx     Multiple sclerosis Neg Hx     Melanoma Neg Hx     Lupus Neg Hx     Psoriasis Neg Hx     Skin cancer Neg Hx         Social History:   Social History     Socioeconomic History    Marital status:      Spouse name: Not on file    Number of children: Not on file    Years of education: Not on file    Highest education level: Not on file   Occupational History    Not on file   Social Needs    Financial resource strain: Not on file    Food insecurity:     Worry: Not on file     Inability: Not on file    Transportation needs:     Medical: Not on file     Non-medical: Not on file   Tobacco Use    Smoking status: Former Smoker    Smokeless tobacco: Never Used    Tobacco comment: quit in 2000   Substance and Sexual Activity    Alcohol use: No     Frequency: Never    Drug use: No    Sexual activity: Not on file   Lifestyle    Physical activity:     Days per week: Not on file     Minutes per session: Not on file    Stress: Not on file   Relationships    Social connections:     Talks on phone: Not on file     Gets together: Not on file     Attends Buddhism service: Not on file     Active member of club or organization: Not on file     Attends meetings of clubs or organizations: Not on file     Relationship status: Not on file   Other Topics Concern    Not on file   Social History Narrative    Not on file        Review of patient's allergies indicates:  Allergies not on file    Current Outpatient Medications   Medication Sig Dispense Refill    albuterol sulfate (VENTOLIN HFA INHL) Inhale into the  lungs as needed.       cyclobenzaprine (FLEXERIL) 10 MG tablet Take 10 mg by mouth 3 (three) times daily as needed for Muscle spasms.      fluticasone (FLONASE) 50 mcg/actuation nasal spray SHAKE LQ AND U 2 SPRAYS IEN D  5    glimepiride (AMARYL) 2 MG tablet TK 2 TS PO BID  1    LANCETS MISC 1 lancet by Misc.(Non-Drug; Combo Route) route once daily.      levalbuterol (XOPENEX) 1.25 mg/3 mL nebulizer solution U 3 ML VIA NEB Q 8 H  6    metFORMIN (GLUCOPHAGE) 500 MG tablet Take 500 mg by mouth daily with breakfast.      metoprolol succinate (TOPROL-XL) 25 MG 24 hr tablet TK 1 T PO BID  3    montelukast (SINGULAIR) 10 mg tablet TK 1 T PO as needed  5    multivit-min/iron/folic/lutein (CENTRUM SILVER WOMEN ORAL) Take by mouth once daily.       nebulizer accessories Kit by Misc.(Non-Drug; Combo Route) route.      olmesartan-hydrochlorothiazide (BENICAR HCT) 40-25 mg per tablet TK 1 T PO QD  1    ONETOUCH ULTRASOFT LANCETS MISC 1 lancet by Misc.(Non-Drug; Combo Route) route 3 (three) times daily.      ONETOUCH VERIO Strp TEST TID AS DIRECTED  1    potassium chloride (MICRO-K) 10 MEQ CpSR TK ONE C PO  QD  5    rosuvastatin (CRESTOR) 10 MG tablet TK 1 T PO D  2    SITagliptin (JANUVIA) 100 MG Tab Take 100 mg by mouth once daily.      amoxicillin-clavulanate 875-125mg (AUGMENTIN) 875-125 mg per tablet TK 1 T PO BID  0    esomeprazole (NEXIUM) 40 MG capsule Take 1 capsule (40 mg total) by mouth once daily. Take one pill every morning 45 minutes before breakfast 30 capsule 6    ondansetron (ZOFRAN) 4 MG tablet TK 1 T PO TID PRF NAUSEA  1    oxyCODONE-acetaminophen (PERCOCET) 5-325 mg per tablet Take 1 tablet by mouth every 4 (four) hours as needed for Pain.      predniSONE (DELTASONE) 10 MG tablet once daily.  0    prucalopride 2 mg Tab Take 1 tablet (2 mg) by mouth once daily. 90 tablet 3     No current facility-administered medications for this visit.         Objective Findings:  Vital Signs:  /84  "  Pulse 78   Ht 5' 7" (1.702 m)   Wt 98 kg (216 lb 1.6 oz)   BMI 33.85 kg/m²   Body mass index is 33.85 kg/m².    Physical Exam:  General appearance: alert, cooperative, no distress  HENT: Normocephalic, atraumatic, neck symmetrical, no nasal discharge  Eyes: conjunctivae/corneas clear, , EOM's intact  Lungs: clear to auscultation bilaterally,   Heart: regular rate and rhythm without rub; no displacement of the PMI  Abdomen: soft, non-tender; bowel sounds normoactive; no organomegaly  Extremities: extremities symmetric; no clubbing, cyanosis, or edema  Integument: Skin color, texture, turgor normal; no rashes; hair distrubution normal  Neurologic: Alert and oriented X 3, normal strength, normal coordination and gait  Psychiatric: no pressured speech; normal affect; no evidence of impaired cognition      Labs:  Lab Results   Component Value Date    WBC 7.78 06/28/2019    HGB 12.6 06/28/2019    HCT 38.7 06/28/2019    MCV 92 06/28/2019     06/28/2019     Lab Results   Component Value Date    FERRITIN 241 02/04/2019     Lab Results   Component Value Date     06/28/2019    K 3.9 06/28/2019     06/28/2019    CO2 27 06/28/2019     (H) 06/28/2019    BUN 20 06/28/2019    CREATININE 0.9 06/28/2019    CALCIUM 10.6 (H) 06/28/2019    PROT 8.1 06/28/2019    ALBUMIN 4.3 06/28/2019    BILITOT 0.7 06/28/2019    ALKPHOS 90 06/28/2019    AST 15 06/28/2019    ALT 14 06/28/2019     Lab Results   Component Value Date    TSH 1.109 02/04/2019     No results found for: SEDRATE  No results found for: CRP  No results found for: LABA1C, HGBA1C        Assessment and Plan:  Louisa Rowley is a 69 y.o. female with a PMH of anemia, asthma, anxiety, colon CA s/p resection in 1997, DM 2, HTN, HLD referred to motility clinic for second opinion regarding the following problems:    GERD. Belch. Regurgitation. Well controlled w nexium 40 mg daily.   -Decrease nexium 40 mg to QOD x one week, then PRN     Chest pain. " Resolved.   Followed by cardiology. EKG, labs, Stress test negative. Will be having ECHO..     Abdominal pain.  Associated with constipation.   Improved w amitiza   -Start IBgard prn as previously advised  -Start Iberogast prn as previously advised    Gas and bloating. w distention.  Avoids lactose and artificial sugars  -Check SIBO as previously advised  -Start IBgard prn  -Start Iberogast prn     Constipation.  ARM revealing pelvic floor dysfunction and hyposensitivity of the rectum, able to pass the balloon.  MRI defecogram revealing incomplete evacuation, cystocele, pathological descent of vaginal apex, sigmoidocele. SmartPill with delayed colonic transit  Unable to tolerate dulcolax d/t abd cramping  No improvement with miralax once daily  No improvement with Amitiza 16 mcg BID  No improvement with Trulance 3 mg daily x 2 weeks  No improvement with lactulose  No improvement with colace  Some improvement with Linzess 290 mcg daily, but lost efficacy  No improvement w Amitiza 24 mcg BID and miralax once daily.  -Start prucalopride 2 mg daily  -Cont amitiza BID and increase miralax to 2-4 times daily until prucalopride approved.   -Cont with pelvic floor muscle retraining, sensitization and biofeedback     History of anxiety. No problems with it any longer. Has not seen psych.     Anemia. Recent labs with normal HGB.  No longer on MV with iron.    Elevated TB.labs with elevated d.bili. Hep A, B, C negative. abd us with fatty liver. Fibro scan with F 0-1 fibrosis nd S3 steatosis. Followed by hepatology.    BRBPR.  Few episodes, am amt of blood. Resolved.  Unable to complete colonoscopy due to recent hemoptysis/bronchiectasis, episode now resolved  -Had colon w  w Dr. Marie in 9/2019 w polyps removed. recommended recall 3 yrs.     Personal history of colon cancer. descending colon 1997. Resected. History of advanced polyps.  Screening colonoscopy due in 5/2019.   Unable to complete colonoscopy due to recent  hemoptysis/bronchiectasis, episode now resolved  -Has had colon in 9/2019 as above.     Colonic blockages x 2. Twisted bowels. surgically repaired. Recent abdominal xray-    Joint pain. Joint swelling. Knees, shoulder, hands. Osteoarthritis.  No raynaud's. No hypermobility. Has seen rheumatologist in the past.     Sciatica. On gabapentin 400 mg BID PRN. Previously followed by neurology. Currently followed by PCP.     Hypokalemia. On potassium 10 mEq daily.     Spot on kidney on abd us.   -Speak to PCP about this.       Follow up in about 3 months (around 1/29/2020) for Motility w Dr. Jimenez.    1. Chronic constipation    2. Bloating    3. Abdominal pain, unspecified abdominal location    4. Gastroesophageal reflux disease, esophagitis presence not specified          Order summary:  Orders Placed This Encounter    prucalopride 2 mg Tab         Thank you so much for allowing me to participate in the care of Louisa Perera, APRN, FNP-C

## 2019-10-31 ENCOUNTER — TELEPHONE (OUTPATIENT)
Dept: PHARMACY | Facility: CLINIC | Age: 69
End: 2019-10-31

## 2019-12-06 ENCOUNTER — TELEPHONE (OUTPATIENT)
Dept: GASTROENTEROLOGY | Facility: CLINIC | Age: 69
End: 2019-12-06

## 2019-12-06 NOTE — TELEPHONE ENCOUNTER
Pt states she started taking motegrity but feels it is not helping. I did tell pt to give it at least 2 weeks. Pt wants to know what she can do in meantime for symptoms of constipation, bloating, gas. Fyi she is currently on antibiotics for bronchitis if that matters.

## 2019-12-06 NOTE — TELEPHONE ENCOUNTER
----- Message from Genoveva Mishra sent at 12/6/2019  8:54 AM CST -----  Contact: pt  Pt needs to talk to you about her medication.  (kayla)  Please call pt.

## 2019-12-09 NOTE — TELEPHONE ENCOUNTER
Please have her add one cap of over the counter miralax once daily with it.      Thanks,  Tiff, NP

## 2019-12-17 ENCOUNTER — TELEPHONE (OUTPATIENT)
Dept: GASTROENTEROLOGY | Facility: CLINIC | Age: 69
End: 2019-12-17

## 2020-01-29 ENCOUNTER — DOCUMENTATION ONLY (OUTPATIENT)
Dept: REHABILITATION | Facility: HOSPITAL | Age: 70
End: 2020-01-29

## 2020-01-29 NOTE — PROGRESS NOTES
1/29/2020    Pt has not attended PT sessions since 9/19/2019 and will be discharged from PT with goal status unknown.

## 2020-03-17 ENCOUNTER — TELEPHONE (OUTPATIENT)
Dept: GASTROENTEROLOGY | Facility: CLINIC | Age: 70
End: 2020-03-17

## 2020-03-17 NOTE — TELEPHONE ENCOUNTER
Pt complains of constipation that she has been having for 2-3 weeks now. Pt states motegrity is not helping. She also states also took a stool softener with no luck. pls advise.

## 2020-04-01 ENCOUNTER — TELEPHONE (OUTPATIENT)
Dept: ENDOSCOPY | Facility: HOSPITAL | Age: 70
End: 2020-04-01

## 2020-04-01 NOTE — TELEPHONE ENCOUNTER
She says has taken the medication 2 times daily. She says it is not helping. She drank a tea made with Senna Pods and had a good BM that day. She wants to know if it is ok to use that. She has not had a BM since then.  She is feeling very bloated. She has not been taking Nexium because she is on Levequin and was not sure she can it with the ABX.  .

## 2020-04-01 NOTE — TELEPHONE ENCOUNTER
----- Message from Andreea Cardona sent at 4/1/2020  4:16 PM CDT -----  Contact: pt [phone]  Pt states that the medication for her bowels is not working. Please call

## 2020-04-02 NOTE — TELEPHONE ENCOUNTER
Confirm she is taking amitiza 24mg BID and pricalopride in am  She can use senna products prn   Ok to continue nexium

## 2020-07-14 ENCOUNTER — TELEPHONE (OUTPATIENT)
Dept: GASTROENTEROLOGY | Facility: CLINIC | Age: 70
End: 2020-07-14

## 2020-07-14 NOTE — TELEPHONE ENCOUNTER
----- Message from Uziel Isidro sent at 7/13/2020  2:27 PM CDT -----  Contact: pt  Pt is calling to speak with staff       Please contact pt: [phone]

## 2020-07-14 NOTE — TELEPHONE ENCOUNTER
Pt states she has been still having constipation. I asked if she was using motegrity with the added amitiza 24 mg bid as  previously recommended. Pt states she uses it but no change. She states that she has been drinking senna tea that her son boils down for her but would like to know what you think of it as this usually helps. pls advise.

## 2020-07-14 NOTE — TELEPHONE ENCOUNTER
She needs to complete at least 8 sessions of pelvic floor physical therapy to address pelvic floor dysfunction  She should continue Amitiza and prucalopride  She can use the senna tea few times per week, I would not advise her to use this daily  She can add miralax 17gm in 8 oz of water daily. Increase it to twice per day if your constipation does not improve after five days.

## 2020-10-05 ENCOUNTER — TELEPHONE (OUTPATIENT)
Dept: GASTROENTEROLOGY | Facility: CLINIC | Age: 70
End: 2020-10-05

## 2020-10-05 DIAGNOSIS — K59.00 CONSTIPATION, UNSPECIFIED CONSTIPATION TYPE: Primary | ICD-10-CM

## 2020-10-05 NOTE — TELEPHONE ENCOUNTER
----- Message from Princess Gonzáles sent at 10/5/2020 12:15 PM CDT -----  Pt called stated she have rectal bleeding, constipation  and black stool. She did not want to schedule she would like to speak with a nurse. Call back [phone]

## 2020-10-05 NOTE — TELEPHONE ENCOUNTER
Constipation.     Is the constipation new? no  Is the constipation worse from baseline? yes  What is the consistency of the stools (1-separate hard lumps, 2-lumpy and sausage like, 3-sausage shape w cracks in the surface, 4- smooth, soft like a snake, 5-soft blobs w clear-cut edges, 6-mushy, 7-liquid with no solid pieces):  1 (but only 2 lumps that were hard nd formed but a little soft.)  How many bowel movements per day: 1 week  Has patient tried any medications (miralax, Linzes, Amitiza, Trulance, Dulcolax, lactulose, senna, Colace, fiber, psyllium)   currently on amitiza 24 mcg and motegrity 2 mg which she says is not helping. She did add miralax 17 gm as instructed , she states no improvement. She was doing senna tea which we told her not to use daily but a few times a week. It seems patient son has had a hard time getting it recently according to her. And according to notes, she's tried trulance 3 mg, lactulose, colace all with no improvement.Some improvement with Linzess 290 mcg daily, but lost efficacy    Rectal Bleeding   When did he/she start bleeding? 2-3 days  How often is he/she bleeding (#per day/week/month)?   How much blood is he/she seeing (trace on tissue paper, teaspoon, tablespoon, % of a cup, entire toilet bowl filled w blood)? Trace on tissue paper   Does he/she have symptoms of anemia (fatigue, dizziness, lightheadedness, SOB, CP)? no  Does pt have history of hemorrhoids: no  Date of last colonoscopy: 2 years ago    Pt states she took enema yesterday that cause her to pass the 2 small flat lumps. But now she noticed it was black.

## 2020-10-08 NOTE — TELEPHONE ENCOUNTER
PT reporting constipation   Trace of blood w straning   One episodes of melena, now resolved.  No iron or pepto  C-scope in 2018, repeat was due in 2019.  Not sure if she had one since.   Never had an EGD          PLAN:  -Stop amitiza  -Linzess 290  -Cont prucalopride  -pt will call Dr. Marie to arrange EGD/colon.  Will call back if unable to arrange   -Will call back if constipation not improved

## 2020-11-03 ENCOUNTER — TELEPHONE (OUTPATIENT)
Dept: GASTROENTEROLOGY | Facility: CLINIC | Age: 70
End: 2020-11-03

## 2020-11-03 NOTE — TELEPHONE ENCOUNTER
Yes per last note we were intending to taper off her PPI and start using it as needed.  Please review this with her  Please schedule next available follow-up appointment with me

## 2020-11-03 NOTE — TELEPHONE ENCOUNTER
"Received refill request for nexium however last visit was 10/2019. Should she be not taking as much , last note states "Decrease nexium 40 mg to every other day for one week, then take it as needed for reflux."  "

## 2020-11-05 ENCOUNTER — TELEPHONE (OUTPATIENT)
Dept: GASTROENTEROLOGY | Facility: CLINIC | Age: 70
End: 2020-11-05

## 2020-11-05 NOTE — TELEPHONE ENCOUNTER
----- Message from Raya Martinez MA sent at 11/5/2020  3:39 PM CST -----  Contact: [phone]   Returning a call to alexa james was not sure what the call was regarding

## 2020-11-05 NOTE — TELEPHONE ENCOUNTER
Scheduled pt for next avail f/u. Pt stated that you asked that she arrange EGD/colon w Dr. Marie however they stated she was not due for next on til 2022.

## 2020-11-10 NOTE — TELEPHONE ENCOUNTER
Pls call Dr. Marie office and let them know that I requestes that pt fu w Dr. Marie re recently reported melena and rectal bleeding.   PLs fax them my note from 10/8 that states the following.      PT reporting constipation   Trace of blood w straning   One episodes of melena, now resolved.  No iron or pepto  C-scope in 2018, repeat was due in 2019.  Not sure if she had one since.   Never had an EGD       PLAN:  -Stop amitiza  -Linzess 290  -Cont prucalopride  -pt will call Dr. Marie to arrange EGD/colon.  Will call back if unable to arrange   -Will call back if constipation not improved    If she is unable to get her procedures I am happy to arrange them for her in [place]

## 2020-11-13 NOTE — TELEPHONE ENCOUNTER
Left message w clinic nurse Deonna/Dr. Marie. Waiting on call back.    Also faxed this note to Dr. Marie.

## 2020-12-12 NOTE — TELEPHONE ENCOUNTER
Pls let pt know that liver function still slightly elevated.  I want to ref to hepatology.  pls arrange    DISPLAY PLAN FREE TEXT

## 2020-12-15 ENCOUNTER — TELEPHONE (OUTPATIENT)
Dept: GASTROENTEROLOGY | Facility: CLINIC | Age: 70
End: 2020-12-15

## 2022-05-17 NOTE — PATIENT INSTRUCTIONS
For constipation: Stop linzess. Start amitiza 24 mcg twice daily.  For bloating/chest pain: Increase nexium 40 mg to twice daily.  We have ordered labs, anorectal manometry, MRI defecogram and a SmartPill for further evaluation.  Eliminate all forms of dairy/lactose (milk, cheese, butter, creamers, ice cream etc) and artificial sweeteners (splenda, equal, stevia, truvia, crystal lite, diet sodas, sugar free candy/gum etc) from your diet for 14 days to see if gas and bloating improve.           Erivedge Counseling- I discussed with the patient the risks of Erivedge including but not limited to nausea, vomiting, diarrhea, constipation, weight loss, changes in the sense of taste, decreased appetite, muscle spasms, and hair loss.  The patient verbalized understanding of the proper use and possible adverse effects of Erivedge.  All of the patient's questions and concerns were addressed.